# Patient Record
Sex: FEMALE | Race: WHITE | NOT HISPANIC OR LATINO | Employment: OTHER | URBAN - METROPOLITAN AREA
[De-identification: names, ages, dates, MRNs, and addresses within clinical notes are randomized per-mention and may not be internally consistent; named-entity substitution may affect disease eponyms.]

---

## 2023-09-14 PROBLEM — E78.2 MIXED HYPERLIPIDEMIA: Status: ACTIVE | Noted: 2023-09-14

## 2023-09-14 PROBLEM — E11.65 TYPE 2 DIABETES MELLITUS WITH HYPERGLYCEMIA, WITHOUT LONG-TERM CURRENT USE OF INSULIN (MULTI): Status: ACTIVE | Noted: 2023-09-14

## 2023-09-14 PROBLEM — H26.9 BILATERAL CATARACTS: Status: ACTIVE | Noted: 2023-09-14

## 2023-09-14 RX ORDER — PEN NEEDLE, DIABETIC 30 GX3/16"
NEEDLE, DISPOSABLE MISCELLANEOUS DAILY
COMMUNITY
End: 2024-02-20 | Stop reason: WASHOUT

## 2023-09-14 RX ORDER — METFORMIN HYDROCHLORIDE 500 MG/1
1000 TABLET, EXTENDED RELEASE ORAL 2 TIMES DAILY
COMMUNITY
End: 2023-10-26 | Stop reason: SDUPTHER

## 2023-09-14 RX ORDER — PRAVASTATIN SODIUM 40 MG/1
40 TABLET ORAL DAILY
COMMUNITY

## 2023-09-14 RX ORDER — SEMAGLUTIDE 1.34 MG/ML
INJECTION, SOLUTION SUBCUTANEOUS
COMMUNITY
Start: 2023-03-13 | End: 2023-10-26 | Stop reason: ALTCHOICE

## 2023-09-14 RX ORDER — INSULIN GLARGINE 100 [IU]/ML
INJECTION, SOLUTION SUBCUTANEOUS DAILY
COMMUNITY
End: 2023-11-08 | Stop reason: WASHOUT

## 2023-09-14 RX ORDER — SEMAGLUTIDE 1.34 MG/ML
0.25 INJECTION, SOLUTION SUBCUTANEOUS
COMMUNITY
Start: 2023-02-24 | End: 2023-12-28 | Stop reason: WASHOUT

## 2023-10-24 ENCOUNTER — LAB (OUTPATIENT)
Dept: LAB | Facility: LAB | Age: 63
End: 2023-10-24
Payer: MEDICARE

## 2023-10-24 DIAGNOSIS — E11.65 TYPE 2 DIABETES MELLITUS WITH HYPERGLYCEMIA (MULTI): Primary | ICD-10-CM

## 2023-10-24 DIAGNOSIS — E78.2 MIXED HYPERLIPIDEMIA: ICD-10-CM

## 2023-10-24 LAB
ALBUMIN SERPL BCP-MCNC: 4.2 G/DL (ref 3.4–5)
ALP SERPL-CCNC: 55 U/L (ref 33–136)
ALT SERPL W P-5'-P-CCNC: 9 U/L (ref 7–45)
ANION GAP SERPL CALC-SCNC: 12 MMOL/L (ref 10–20)
AST SERPL W P-5'-P-CCNC: 10 U/L (ref 9–39)
BASOPHILS # BLD AUTO: 0.03 X10*3/UL (ref 0–0.1)
BASOPHILS NFR BLD AUTO: 0.7 %
BILIRUB SERPL-MCNC: 0.6 MG/DL (ref 0–1.2)
BUN SERPL-MCNC: 13 MG/DL (ref 6–23)
CALCIUM SERPL-MCNC: 9.4 MG/DL (ref 8.6–10.3)
CHLORIDE SERPL-SCNC: 100 MMOL/L (ref 98–107)
CHOLEST SERPL-MCNC: 154 MG/DL (ref 0–199)
CHOLESTEROL/HDL RATIO: 2.1
CO2 SERPL-SCNC: 28 MMOL/L (ref 21–32)
CREAT SERPL-MCNC: 0.56 MG/DL (ref 0.5–1.05)
CREAT UR-MCNC: 50.9 MG/DL (ref 20–320)
EOSINOPHIL # BLD AUTO: 0.09 X10*3/UL (ref 0–0.7)
EOSINOPHIL NFR BLD AUTO: 2 %
ERYTHROCYTE [DISTWIDTH] IN BLOOD BY AUTOMATED COUNT: 11.9 % (ref 11.5–14.5)
GFR SERPL CREATININE-BSD FRML MDRD: >90 ML/MIN/1.73M*2
GLUCOSE SERPL-MCNC: 100 MG/DL (ref 74–99)
HCT VFR BLD AUTO: 36.3 % (ref 36–46)
HDLC SERPL-MCNC: 74.2 MG/DL
HGB BLD-MCNC: 11.5 G/DL (ref 12–16)
IMM GRANULOCYTES # BLD AUTO: 0.01 X10*3/UL (ref 0–0.7)
IMM GRANULOCYTES NFR BLD AUTO: 0.2 % (ref 0–0.9)
LDLC SERPL CALC-MCNC: 68 MG/DL
LYMPHOCYTES # BLD AUTO: 1.67 X10*3/UL (ref 1.2–4.8)
LYMPHOCYTES NFR BLD AUTO: 37.8 %
MCH RBC QN AUTO: 29.4 PG (ref 26–34)
MCHC RBC AUTO-ENTMCNC: 31.7 G/DL (ref 32–36)
MCV RBC AUTO: 93 FL (ref 80–100)
MICROALBUMIN UR-MCNC: <7 MG/L
MICROALBUMIN/CREAT UR: NORMAL MG/G{CREAT}
MONOCYTES # BLD AUTO: 0.37 X10*3/UL (ref 0.1–1)
MONOCYTES NFR BLD AUTO: 8.4 %
NEUTROPHILS # BLD AUTO: 2.25 X10*3/UL (ref 1.2–7.7)
NEUTROPHILS NFR BLD AUTO: 50.9 %
NON HDL CHOLESTEROL: 80 MG/DL (ref 0–149)
NRBC BLD-RTO: 0 /100 WBCS (ref 0–0)
PLATELET # BLD AUTO: 285 X10*3/UL (ref 150–450)
PMV BLD AUTO: 11.1 FL (ref 7.5–11.5)
POTASSIUM SERPL-SCNC: 4.3 MMOL/L (ref 3.5–5.3)
PROT SERPL-MCNC: 6.3 G/DL (ref 6.4–8.2)
RBC # BLD AUTO: 3.91 X10*6/UL (ref 4–5.2)
SODIUM SERPL-SCNC: 136 MMOL/L (ref 136–145)
TRIGL SERPL-MCNC: 58 MG/DL (ref 0–149)
TSH SERPL-ACNC: 2.45 MIU/L (ref 0.44–3.98)
VIT B12 SERPL-MCNC: 235 PG/ML (ref 211–911)
VLDL: 12 MG/DL (ref 0–40)
WBC # BLD AUTO: 4.4 X10*3/UL (ref 4.4–11.3)

## 2023-10-24 PROCEDURE — 82043 UR ALBUMIN QUANTITATIVE: CPT

## 2023-10-24 PROCEDURE — 82570 ASSAY OF URINE CREATININE: CPT

## 2023-10-24 PROCEDURE — 82607 VITAMIN B-12: CPT

## 2023-10-24 PROCEDURE — 36415 COLL VENOUS BLD VENIPUNCTURE: CPT

## 2023-10-25 NOTE — PROGRESS NOTES
"HPI:    62 yo with  Diabetes 2 (workup for late onset type 1 neg), Dyslipidemia presents for followup. Last A1c-7.6%, today . Pt is testing sugars <1 times per day. Pt is having low sugars 0 times/week. Pt's typical blood sugars are running 100 typically , highest 160, not testing at other times of day.  Pt is following a carb controlled diet and knows reasonable carb allowances. Pt is able to afford their medications. Pt is exercising walking 3 miles/day.           Pt taking metformin, glimepiride 4mg qd-pt recently stopped, ozempic 0.5 mg weekly (decreased from 1mg as pt's wt went to 106lbs) ,jardiance nov 2019 came off fall 2020 due to repeat mycotic infections.           Taking pravastatin 40mg for primary prevention.      Current Outpatient Medications:     insulin glargine (Lantus Solostar U-100 Insulin) 100 unit/mL (3 mL) pen, Inject under the skin once daily. UP TO 30 UNITS UNDER THE SKIN, Disp: , Rfl:     insulin glargine (Lantus) 100 unit/mL (3 mL) pen, INJECT UP TO 30 UNITS UNDER THE SKIN ONCE A DAY AS DIRECTED, Disp: 30 mL, Rfl: 1    ketoconazole (NIZOral) 2 % shampoo, APPLY SHAMPOO TO SCALP 2-3 TIMES WEEKLY, LEAVE ON 5-10 MINS, THEN RINSE, Disp: 120 mL, Rfl: 0    ONETOUCH DELICA LANCETS MISC, once daily. Test as directed, Disp: , Rfl:     pen needle, diabetic 32 gauge x 5/32\" needle, once daily., Disp: , Rfl:     pen needle, diabetic 32 gauge x 5/32\" needle, USE AS DIRECTED DAILY, Disp: 100 each, Rfl: 3    pravastatin (Pravachol) 40 mg tablet, Take 1 tablet (40 mg) by mouth once daily., Disp: , Rfl:     metFORMIN  mg 24 hr tablet, Take 1 tablet (500 mg) by mouth 2 times a day with meals., Disp: 360 tablet, Rfl: 3    semaglutide (Ozempic) 0.25 mg or 0.5 mg(2 mg/1.5 mL) pen injector, Inject 0.25 mg under the skin 1 (one) time per week., Disp: , Rfl:      Review of Systems:  Cardiology: Lightheadedness-denies.  Chest pain-denies.  Leg edema-denies.  Palpitations-denies.  Respiratory: Cough-denies. " Shortness of breath-denies.  Wheezing-denies.  Gastroenterology: Constipation-denies.  Diarrhea-denies.  Heartburn-denies.  Endocrinology: Cold intolerance-denies.  Heat intolerance-denies.  Sweats-denies.  Neurology: Headache-denies.  Tremor-denies.  Neuropathy in extremities-denies.  Psychology: Low energy-denies.  Irritability-denies.  Sleep disturbances-denies.    Labs: oct 2023- cr-0.56, lft-wnl, ldl-68, urine neg, tsh-2.45, hematocrit-36.3%    /70   Wt 54.7 kg (120 lb 9.6 oz)   BMI 21.36 kg/m²     Physical Exam:  General Appearance: pleasant, cooperative, no acute distress  HEENT: no chemosis, no proptosis, no lid lag, no lid retraction  Neck: no lymphadenopathy, no thyromegaly, no dominant thyroid nodules  Heart: no murmurs, regular rate and rhythm, S1 and S2  Lungs: no wheezes, no rhonci, no rales  Extremities: no lower extremity swelling    Assessment and Plan:  1. Type 2 diabetes mellitus with hyperglycemia, without long-term current use of insulin (CMS/[facility])  -good response to basal insulin  -wt has come up to a safe range after lowering ozempic to 0.5mg  -gave info to get a torito 2 or 3, once you get it call office for training with Anthony Lyon  -at that visit work on pt assistance/help through pharmacy for ozempic  -reviewed labs with pt    2. Mixed hyperlipidemia  -on statin and tolerating         Follow Up:  -Valdemar 6 months    -labs/tests/notes reviewed  -reviewed and counseled patient on medication monitoring and side effects

## 2023-10-26 ENCOUNTER — OFFICE VISIT (OUTPATIENT)
Dept: ENDOCRINOLOGY | Facility: CLINIC | Age: 63
End: 2023-10-26
Payer: MEDICARE

## 2023-10-26 ENCOUNTER — PHARMACY VISIT (OUTPATIENT)
Dept: PHARMACY | Facility: CLINIC | Age: 63
End: 2023-10-26
Payer: MEDICARE

## 2023-10-26 VITALS — BODY MASS INDEX: 21.36 KG/M2 | WEIGHT: 120.6 LBS | DIASTOLIC BLOOD PRESSURE: 70 MMHG | SYSTOLIC BLOOD PRESSURE: 124 MMHG

## 2023-10-26 DIAGNOSIS — E78.2 MIXED HYPERLIPIDEMIA: ICD-10-CM

## 2023-10-26 DIAGNOSIS — E11.65 TYPE 2 DIABETES MELLITUS WITH HYPERGLYCEMIA, WITHOUT LONG-TERM CURRENT USE OF INSULIN (MULTI): Primary | ICD-10-CM

## 2023-10-26 LAB — POC HEMOGLOBIN A1C: 6.6 % (ref 4.2–6.5)

## 2023-10-26 PROCEDURE — 3048F LDL-C <100 MG/DL: CPT | Performed by: INTERNAL MEDICINE

## 2023-10-26 PROCEDURE — RXMED WILLOW AMBULATORY MEDICATION CHARGE

## 2023-10-26 PROCEDURE — 3078F DIAST BP <80 MM HG: CPT | Performed by: INTERNAL MEDICINE

## 2023-10-26 PROCEDURE — 3062F POS MACROALBUMINURIA REV: CPT | Performed by: INTERNAL MEDICINE

## 2023-10-26 PROCEDURE — 1159F MED LIST DOCD IN RCRD: CPT | Performed by: INTERNAL MEDICINE

## 2023-10-26 PROCEDURE — 83036 HEMOGLOBIN GLYCOSYLATED A1C: CPT | Performed by: INTERNAL MEDICINE

## 2023-10-26 PROCEDURE — 1126F AMNT PAIN NOTED NONE PRSNT: CPT | Performed by: INTERNAL MEDICINE

## 2023-10-26 PROCEDURE — 3074F SYST BP LT 130 MM HG: CPT | Performed by: INTERNAL MEDICINE

## 2023-10-26 PROCEDURE — 99214 OFFICE O/P EST MOD 30 MIN: CPT | Performed by: INTERNAL MEDICINE

## 2023-10-26 PROCEDURE — 1036F TOBACCO NON-USER: CPT | Performed by: INTERNAL MEDICINE

## 2023-10-26 RX ORDER — METFORMIN HYDROCHLORIDE 500 MG/1
500 TABLET, EXTENDED RELEASE ORAL
Qty: 360 TABLET | Refills: 3 | Status: SHIPPED | OUTPATIENT
Start: 2023-10-26 | End: 2024-01-08 | Stop reason: WASHOUT

## 2023-10-26 ASSESSMENT — PATIENT HEALTH QUESTIONNAIRE - PHQ9
1. LITTLE INTEREST OR PLEASURE IN DOING THINGS: NOT AT ALL
2. FEELING DOWN, DEPRESSED OR HOPELESS: NOT AT ALL
SUM OF ALL RESPONSES TO PHQ9 QUESTIONS 1 & 2: 0

## 2023-10-26 ASSESSMENT — PAIN SCALES - GENERAL: PAINLEVEL: 0-NO PAIN

## 2023-10-26 ASSESSMENT — ENCOUNTER SYMPTOMS
OCCASIONAL FEELINGS OF UNSTEADINESS: 0
DEPRESSION: 0
LOSS OF SENSATION IN FEET: 0

## 2023-10-28 ENCOUNTER — PHARMACY VISIT (OUTPATIENT)
Dept: PHARMACY | Facility: CLINIC | Age: 63
End: 2023-10-28
Payer: MEDICARE

## 2023-10-28 PROCEDURE — RXMED WILLOW AMBULATORY MEDICATION CHARGE

## 2023-10-30 ENCOUNTER — PHARMACY VISIT (OUTPATIENT)
Dept: PHARMACY | Facility: CLINIC | Age: 63
End: 2023-10-30
Payer: COMMERCIAL

## 2023-10-30 PROCEDURE — RXMED WILLOW AMBULATORY MEDICATION CHARGE

## 2023-10-30 RX ORDER — METFORMIN HYDROCHLORIDE 500 MG/1
1000 TABLET, EXTENDED RELEASE ORAL 2 TIMES DAILY
Qty: 360 TABLET | Refills: 3 | OUTPATIENT
Start: 2023-10-30

## 2023-11-08 ENCOUNTER — PHARMACY VISIT (OUTPATIENT)
Dept: PHARMACY | Facility: CLINIC | Age: 63
End: 2023-11-08
Payer: MEDICARE

## 2023-11-08 DIAGNOSIS — E11.65 TYPE 2 DIABETES MELLITUS WITH HYPERGLYCEMIA, WITHOUT LONG-TERM CURRENT USE OF INSULIN (MULTI): Primary | ICD-10-CM

## 2023-11-08 DIAGNOSIS — E11.65 TYPE 2 DIABETES MELLITUS WITH HYPERGLYCEMIA, WITHOUT LONG-TERM CURRENT USE OF INSULIN (MULTI): ICD-10-CM

## 2023-11-08 RX ORDER — INSULIN GLARGINE 100 [IU]/ML
INJECTION, SOLUTION SUBCUTANEOUS
Qty: 30 ML | Refills: 3 | Status: SHIPPED | OUTPATIENT
Start: 2023-11-08

## 2023-11-08 RX ORDER — INSULIN GLARGINE 100 [IU]/ML
30 INJECTION, SOLUTION SUBCUTANEOUS NIGHTLY
COMMUNITY
End: 2023-11-08 | Stop reason: SDUPTHER

## 2023-11-08 RX ORDER — INSULIN GLARGINE 100 [IU]/ML
INJECTION, SOLUTION SUBCUTANEOUS
Qty: 30 ML | Refills: 1 | Status: SHIPPED | OUTPATIENT
Start: 2023-11-08 | End: 2023-11-08 | Stop reason: WASHOUT

## 2023-11-15 ENCOUNTER — PHARMACY VISIT (OUTPATIENT)
Dept: PHARMACY | Facility: CLINIC | Age: 63
End: 2023-11-15
Payer: MEDICARE

## 2023-11-27 NOTE — PROGRESS NOTES
Subjective   Patient ID: Amaya Quiroz is a 63 y.o. female who presents for CGM training and education.  HPI:  62 yo with  Diabetes 2 (workup for late onset type 1 neg), and Dyslipidemia. Accompanied by her  for CGM training - brings supplies from [employer].   Last A1c 6.6%. Pt is testing sugars <1 times per day. Pt is having low sugars 0 times/week. Pt's typical blood sugars are running 100 typically, highest 160, not testing at other times of day.    New start on Tony 3 CGM today - franck downloaded on phone, account created and connected to practice in [place], and sensor successfully applied and started.   Pt is following a carb controlled diet and knows reasonable carb allowances.   Pt has been able to afford her medications until recently - was getting assistance through [facility] pharmacy who can no longer help with cost. Reports able to afford insulin, but she is in the coverage gap and the Ozempic is over $500. Will look into  PAP. Pt is exercising walking 3 miles/day.           Pt taking Lantus 10 units daily (received letter from insurance that Lantus will no longer be covered next year - must switch to Basaglar, Tresiba, or Toujeo), metformin ER 500mg - 2BID, Ozempic 0.5 mg weekly (decreased from 1mg as pt's wt went to 106lbs; has been tolerating well at 0.5mg).  - Unable to tolerate Jardiance due to repeat mycotic infections (stopped in 2020).  - Stopped glimepiride 4mg prior to last visit.            Taking pravastatin 40mg for primary prevention.      Current Outpatient Medications:     ketoconazole (NIZOral) 2 % shampoo, APPLY SHAMPOO TO SCALP 2-3 TIMES WEEKLY, LEAVE ON 5-10 MINS, THEN RINSE, Disp: 120 mL, Rfl: 0    Lantus Solostar U-100 Insulin 100 unit/mL (3 mL) pen, Take as directed per insulin instructions., Disp: 30 mL, Rfl: 3    metFORMIN  mg 24 hr tablet, Take 1 tablet (500 mg) by mouth 2 times a day with meals., Disp: 360 tablet, Rfl: 3    metFORMIN  mg 24 hr  "tablet, Take 2 tablets by mouth twice daily, Disp: 360 tablet, Rfl: 3    ONETOUCH DELICA LANCETS MISC, once daily. Test as directed, Disp: , Rfl:     pen needle, diabetic 32 gauge x 5/32\" needle, once daily., Disp: , Rfl:     pen needle, diabetic 32 gauge x 5/32\" needle, USE AS DIRECTED DAILY, Disp: 100 each, Rfl: 3    pravastatin (Pravachol) 40 mg tablet, Take 1 tablet (40 mg) by mouth once daily., Disp: , Rfl:     pravastatin (Pravachol) 40 mg tablet, Take 1 tablet (40 mg) by mouth once daily., Disp: 90 tablet, Rfl: 0    semaglutide (Ozempic) 0.25 mg or 0.5 mg(2 mg/1.5 mL) pen injector, Inject 0.25 mg under the skin 1 (one) time per week., Disp: , Rfl:      Review of Systems:  Cardiology: Lightheadedness-denies.  Chest pain-denies.  Leg edema-denies.  Palpitations-denies.  Respiratory: Cough-denies. Shortness of breath-denies.  Wheezing-denies.  Gastroenterology: Constipation-denies.  Diarrhea-denies.  Heartburn-denies.  Endocrinology: Cold intolerance-denies.  Heat intolerance-denies.  Sweats-denies.  Neurology: Headache-denies.  Tremor-denies.  Neuropathy in extremities-denies.  Psychology: Low energy-denies.  Irritability-denies.  Sleep disturbances-denies.    Labs: oct 2023- cr-0.56, lft-wnl, ldl-68, urine neg, tsh-2.45, hematocrit-36.3%    Assessment and Plan:  Problem List Items Addressed This Visit       Type 2 diabetes mellitus with hyperglycemia, without long-term current use of insulin (CMS/[facility]) - Primary     Patient received the following instructions for Tony 3 personal start:  Sensor application and start up of sensor; aware to change in 14 days. Products for better adhesion; skin tac and simpatch. Expected differences in sensor glucose and blood glucose; always check with meter if symptoms do not match sensor glucose or if magnify glass appears on display. Trend arrows. Kalen functions: target ranges set to ; alerts set if applicable. Connecting to practice via [employer] in [place]. Remove for MRI, " CAT scan and X-ray. Call [facility] for problems with sensor, they will replace. Patient correctly applied sensor to back of upper arm and sensor session started.    Plan:   Check glucose on the Tony franck often - upon waking, before/after meals, bedtime, etc.   Follow readings and daily graphs closely, and learn which meals/snacks cause higher blood sugars.           Follow Up:  - Crystal LyonD, for cost assistance over next 1-2 months  - Valdemar 6 months as scheduled    CGM training and education provided by GLENDY Mendez, CrystalD, BCACP, CDCES.

## 2023-11-27 NOTE — ASSESSMENT & PLAN NOTE
Patient received the following instructions for Tony 3 personal start:  Sensor application and start up of sensor; aware to change in 14 days. Products for better adhesion; skin tac and simpatch. Expected differences in sensor glucose and blood glucose; always check with meter if symptoms do not match sensor glucose or if magnify glass appears on display. Trend arrows. Kalen functions: target ranges set to ; alerts set if applicable. Connecting to practice via [employer] in kalen. Remove for MRI, CAT scan and X-ray. Call [facility] for problems with sensor, they will replace. Patient correctly applied sensor to back of upper arm and sensor session started.    Plan:   Check glucose on the Tony kalen often - upon waking, before/after meals, bedtime, etc.   Follow readings and daily graphs closely, and learn which meals/snacks cause higher blood sugars.

## 2023-11-27 NOTE — PATIENT INSTRUCTIONS
Sonali will follow up with you about patient assistance for your Ozempic and insulin through  Pharmacies    Check glucose on the Tony franck often - upon waking, before meals, 1-2 hours after meals, bedtime, etc.    Goal sugars at different times of the day:  - Fasting / upon waking: less than 130  - Before meals: around 150 or less  - One or two hours after meals: less than 180  - Before bedtime: around 150 or less     Follow readings and daily graphs closely, and learn which meals/snacks cause higher blood sugars.    Call with any questions or concerns.     Follow up with Dr. Aldrich as scheduled.

## 2023-11-28 ENCOUNTER — CLINICAL SUPPORT (OUTPATIENT)
Dept: ENDOCRINOLOGY | Facility: CLINIC | Age: 63
End: 2023-11-28
Payer: MEDICARE

## 2023-11-28 ENCOUNTER — PHARMACY VISIT (OUTPATIENT)
Dept: PHARMACY | Facility: CLINIC | Age: 63
End: 2023-11-28
Payer: MEDICARE

## 2023-11-28 DIAGNOSIS — E11.65 TYPE 2 DIABETES MELLITUS WITH HYPERGLYCEMIA, WITHOUT LONG-TERM CURRENT USE OF INSULIN (MULTI): Primary | ICD-10-CM

## 2023-11-28 PROCEDURE — RXMED WILLOW AMBULATORY MEDICATION CHARGE

## 2023-11-28 RX ORDER — PRAVASTATIN SODIUM 40 MG/1
40 TABLET ORAL DAILY
Qty: 90 TABLET | Refills: 0 | OUTPATIENT
Start: 2023-05-22 | End: 2024-01-08 | Stop reason: WASHOUT

## 2023-12-26 ENCOUNTER — TELEPHONE (OUTPATIENT)
Dept: ENDOCRINOLOGY | Facility: CLINIC | Age: 63
End: 2023-12-26
Payer: MEDICARE

## 2023-12-26 NOTE — TELEPHONE ENCOUNTER
No samples are available in the office. Patient is advise to call office Friday when provider is in.

## 2023-12-26 NOTE — TELEPHONE ENCOUNTER
Amaya Quiroz   1960   [id number]   [phone]       Patient called and wanted to know if she can get a couple of sample pens of Ozempic due to she can not afford cost. However, patient mentioned she is working with Sonali for Patient Assistance. Message sent to provider.

## 2023-12-28 ENCOUNTER — TELEPHONE (OUTPATIENT)
Dept: ENDOCRINOLOGY | Facility: CLINIC | Age: 63
End: 2023-12-28
Payer: MEDICARE

## 2023-12-28 DIAGNOSIS — E11.65 TYPE 2 DIABETES MELLITUS WITH HYPERGLYCEMIA, WITHOUT LONG-TERM CURRENT USE OF INSULIN (MULTI): Primary | ICD-10-CM

## 2023-12-28 PROCEDURE — RXMED WILLOW AMBULATORY MEDICATION CHARGE

## 2023-12-28 RX ORDER — SEMAGLUTIDE 0.68 MG/ML
0.5 INJECTION, SOLUTION SUBCUTANEOUS
Qty: 3 ML | Refills: 0 | Status: SHIPPED | OUTPATIENT
Start: 2023-12-28 | End: 2024-01-19 | Stop reason: SDUPTHER

## 2023-12-28 NOTE — TELEPHONE ENCOUNTER
Pt unable to afford Ozempic at this time - see note from last visit.   Will send one month of Ozempic to [facility] pharmacy and use free trial card to supply patient for another month, then enroll in  PAP.

## 2023-12-29 ENCOUNTER — PHARMACY VISIT (OUTPATIENT)
Dept: PHARMACY | Facility: CLINIC | Age: 63
End: 2023-12-29
Payer: COMMERCIAL

## 2024-01-03 ENCOUNTER — TELEPHONE (OUTPATIENT)
Dept: PRIMARY CARE | Facility: CLINIC | Age: 64
End: 2024-01-03
Payer: MEDICARE

## 2024-01-03 NOTE — TELEPHONE ENCOUNTER
Patient is calling she has been sick for 2 weeks, c/o sinus pressure, coughing, no production, runny nose, chest feels tight, blowing a clear to green mucous, she has been using Tylenol, she is looking for advise

## 2024-01-08 ENCOUNTER — PHARMACY VISIT (OUTPATIENT)
Dept: PHARMACY | Facility: CLINIC | Age: 64
End: 2024-01-08
Payer: COMMERCIAL

## 2024-01-08 ENCOUNTER — OFFICE VISIT (OUTPATIENT)
Dept: PRIMARY CARE | Facility: CLINIC | Age: 64
End: 2024-01-08
Payer: MEDICARE

## 2024-01-08 VITALS
BODY MASS INDEX: 21.65 KG/M2 | DIASTOLIC BLOOD PRESSURE: 76 MMHG | HEART RATE: 84 BPM | WEIGHT: 122.2 LBS | SYSTOLIC BLOOD PRESSURE: 138 MMHG

## 2024-01-08 DIAGNOSIS — Z78.0 ENCOUNTER FOR OSTEOPOROSIS SCREENING IN ASYMPTOMATIC POSTMENOPAUSAL PATIENT: ICD-10-CM

## 2024-01-08 DIAGNOSIS — R06.2 WHEEZING: ICD-10-CM

## 2024-01-08 DIAGNOSIS — Z12.31 ENCOUNTER FOR SCREENING MAMMOGRAM FOR MALIGNANT NEOPLASM OF BREAST: ICD-10-CM

## 2024-01-08 DIAGNOSIS — Z13.820 ENCOUNTER FOR OSTEOPOROSIS SCREENING IN ASYMPTOMATIC POSTMENOPAUSAL PATIENT: ICD-10-CM

## 2024-01-08 DIAGNOSIS — J01.10 ACUTE NON-RECURRENT FRONTAL SINUSITIS: Primary | ICD-10-CM

## 2024-01-08 PROBLEM — G56.00 CARPAL TUNNEL SYNDROME: Status: ACTIVE | Noted: 2017-01-05

## 2024-01-08 PROCEDURE — RXMED WILLOW AMBULATORY MEDICATION CHARGE

## 2024-01-08 PROCEDURE — 1036F TOBACCO NON-USER: CPT | Performed by: FAMILY MEDICINE

## 2024-01-08 PROCEDURE — 1126F AMNT PAIN NOTED NONE PRSNT: CPT | Performed by: FAMILY MEDICINE

## 2024-01-08 PROCEDURE — 1160F RVW MEDS BY RX/DR IN RCRD: CPT | Performed by: FAMILY MEDICINE

## 2024-01-08 PROCEDURE — 99213 OFFICE O/P EST LOW 20 MIN: CPT | Performed by: FAMILY MEDICINE

## 2024-01-08 PROCEDURE — 1159F MED LIST DOCD IN RCRD: CPT | Performed by: FAMILY MEDICINE

## 2024-01-08 PROCEDURE — 1170F FXNL STATUS ASSESSED: CPT | Performed by: FAMILY MEDICINE

## 2024-01-08 PROCEDURE — 3075F SYST BP GE 130 - 139MM HG: CPT | Performed by: FAMILY MEDICINE

## 2024-01-08 PROCEDURE — 3078F DIAST BP <80 MM HG: CPT | Performed by: FAMILY MEDICINE

## 2024-01-08 RX ORDER — TRIAMCINOLONE ACETONIDE 0.25 MG/G
CREAM TOPICAL
COMMUNITY
Start: 2023-06-15

## 2024-01-08 RX ORDER — BENZONATATE 200 MG/1
200 CAPSULE ORAL 3 TIMES DAILY PRN
Qty: 60 CAPSULE | Refills: 0 | Status: SHIPPED | OUTPATIENT
Start: 2024-01-08 | End: 2024-01-28

## 2024-01-08 RX ORDER — DOXYCYCLINE 100 MG/1
100 CAPSULE ORAL 2 TIMES DAILY
Qty: 14 CAPSULE | Refills: 0 | Status: SHIPPED | OUTPATIENT
Start: 2024-01-08 | End: 2024-01-15

## 2024-01-08 RX ORDER — METHYLPREDNISOLONE 4 MG/1
TABLET ORAL
Qty: 21 TABLET | Refills: 0 | Status: SHIPPED | OUTPATIENT
Start: 2024-01-08 | End: 2024-02-20 | Stop reason: WASHOUT

## 2024-01-08 ASSESSMENT — PATIENT HEALTH QUESTIONNAIRE - PHQ9
2. FEELING DOWN, DEPRESSED OR HOPELESS: NOT AT ALL
1. LITTLE INTEREST OR PLEASURE IN DOING THINGS: NOT AT ALL
SUM OF ALL RESPONSES TO PHQ9 QUESTIONS 1 AND 2: 0

## 2024-01-08 ASSESSMENT — ACTIVITIES OF DAILY LIVING (ADL)
WALKS IN HOME: INDEPENDENT
DRESSING YOURSELF: INDEPENDENT
HEARING - LEFT EAR: FUNCTIONAL
TOILETING: INDEPENDENT
BATHING: INDEPENDENT
FEEDING YOURSELF: INDEPENDENT
PATIENT'S MEMORY ADEQUATE TO SAFELY COMPLETE DAILY ACTIVITIES?: YES
GROOMING: INDEPENDENT
HEARING - RIGHT EAR: FUNCTIONAL
ADEQUATE_TO_COMPLETE_ADL: YES
JUDGMENT_ADEQUATE_SAFELY_COMPLETE_DAILY_ACTIVITIES: YES

## 2024-01-08 ASSESSMENT — PAIN SCALES - GENERAL: PAINLEVEL: 0-NO PAIN

## 2024-01-08 NOTE — PROGRESS NOTES
" Sinusitis: sick for 10 days         presents with c/o Facial pain Yes frontal area         Nasal congestion Yes          Rhinorrhea Yes clear to purulent         Fever No          Sore throat No          Post-nasal drip No           Cough productive of clear sputum   Ear/General:          c/o Pain  itching/blocked     ENT/Respiratory:          c/o Shortness of breath No          wheezing Yes     MEDICAL HISTORY:    Patient Active Problem List   Diagnosis    Bilateral cataracts    Mixed hyperlipidemia    Type 2 diabetes mellitus with hyperglycemia, without long-term current use of insulin (CMS/[facility])    Carpal tunnel syndrome      MEDICATIONS:    Current Outpatient Medications   Medication Sig Dispense Refill    Lantus Solostar U-100 Insulin 100 unit/mL (3 mL) pen Take as directed per insulin instructions. 30 mL 3    metFORMIN  mg 24 hr tablet Take 2 tablets by mouth twice daily 360 tablet 3    ONETOUCH DELICA LANCETS MISC once daily. Test as directed      Ozempic 0.25 mg or 0.5 mg (2 mg/3 mL) pen injector Inject 0.5 mg under the skin 1 (one) time per week. 3 mL 0    pen needle, diabetic 32 gauge x 5/32\" needle once daily.      pen needle, diabetic 32 gauge x 5/32\" needle USE AS DIRECTED DAILY 100 each 3    pravastatin (Pravachol) 40 mg tablet Take 1 tablet (40 mg) by mouth once daily.      triamcinolone (Kenalog) 0.025 % cream APPLY TO AFFECTED AREA(S) 2 TIMES A DAY FOR 14 DAYS, THEN STOP FOR AT LEAST 7 DAYS. MAY REPEAT TREATMENT AS NEEDED      benzonatate (Tessalon) 200 mg capsule Take 1 capsule (200 mg) by mouth 3 times a day as needed for cough for up to 20 days. Do not crush or chew. 60 capsule 0    doxycycline (Monodox) 100 mg capsule Take 1 capsule (100 mg) by mouth 2 times a day for 7 days. Take with at least 8 ounces (large glass) of water, do not lie down for 30 minutes after 14 capsule 0    ketoconazole (NIZOral) 2 % shampoo APPLY SHAMPOO TO SCALP 2-3 TIMES WEEKLY, LEAVE ON 5-10 MINS, THEN RINSE " (Patient not taking: Reported on 1/8/2024) 120 mL 0    methylPREDNISolone (Medrol Dospak) 4 mg tablets Take each day's dose once a day in the AM with food 21 each 0     No current facility-administered medications for this visit.     ALLERGIES: No Known Allergies  SOCIAL HISTORY:   Social History     Tobacco Use    Smoking status: Never    Smokeless tobacco: Never   Vaping Use    Vaping Use: Never used   Substance Use Topics    Alcohol use: Not Currently    Drug use: Defer       ROS:       CONSTITUTION:  see HPI for details        HEENT:          see HPI for details.         RESPIRATORY:          See HPI for details.         GASTROENTEROLOGY:          See HPI for details.        VITAL SIGNS:  /76   Pulse 84   Wt 55.4 kg (122 lb 3.2 oz)   BMI 21.65 kg/m²     PE:   HEENT:  nc/at, sinuses NT, ears with nl external canals, TM normal w/o redness, bulging, throat normal w/o redness/exdudate.tonsillar swelling  NECK:  no lymphadenopathy; FROM  LUNGS:  CTA, no wheezing/rhonchi/rales  HEART:  RRR, no murmurs      Amaya was seen today for cough.  Diagnoses and all orders for this visit:  Acute non-recurrent frontal sinusitis (Primary)  -     doxycycline (Monodox) 100 mg capsule; Take 1 capsule (100 mg) by mouth 2 times a day for 7 days. Take with at least 8 ounces (large glass) of water, do not lie down for 30 minutes after  -     benzonatate (Tessalon) 200 mg capsule; Take 1 capsule (200 mg) by mouth 3 times a day as needed for cough for up to 20 days. Do not crush or chew.  Wheezing  -     methylPREDNISolone (Medrol Dospak) 4 mg tablets; Take each day's dose once a day in the AM with food  Encounter for screening mammogram for malignant neoplasm of breast  -     BI mammo bilateral screening tomosynthesis; Future  Encounter for osteoporosis screening in asymptomatic postmenopausal patient  -     XR DEXA bone density; Future

## 2024-01-19 ENCOUNTER — PHARMACY VISIT (OUTPATIENT)
Dept: PHARMACY | Facility: CLINIC | Age: 64
End: 2024-01-19
Payer: COMMERCIAL

## 2024-01-19 DIAGNOSIS — E11.65 TYPE 2 DIABETES MELLITUS WITH HYPERGLYCEMIA, WITHOUT LONG-TERM CURRENT USE OF INSULIN (MULTI): ICD-10-CM

## 2024-01-19 PROCEDURE — RXMED WILLOW AMBULATORY MEDICATION CHARGE

## 2024-01-19 RX ORDER — SEMAGLUTIDE 0.68 MG/ML
0.5 INJECTION, SOLUTION SUBCUTANEOUS
Qty: 3 ML | Refills: 0 | Status: SHIPPED | OUTPATIENT
Start: 2024-01-19 | End: 2024-02-20 | Stop reason: SDUPTHER

## 2024-01-22 ENCOUNTER — TELEPHONE (OUTPATIENT)
Dept: ENDOCRINOLOGY | Facility: CLINIC | Age: 64
End: 2024-01-22
Payer: MEDICARE

## 2024-01-22 NOTE — TELEPHONE ENCOUNTER
Amaya Quiroz   1960   [id number]   [phone]       Sonali: Patient called in need of Patient Assistance for Ozempic. However, patient was already  provided samples and have one week left of Ozempic. Patient mentioned she wanted to wait until they get 2023 w-2 to submit documentation because she retired last year. Patient is requesting that you call her because I am not sure if she understand and I have provided all information that's on sheet.     Dr. Aldrich would you like for patient to go on another medication because she only one week left of Ozempic and can not afford he cost.

## 2024-02-12 ENCOUNTER — HOSPITAL ENCOUNTER (OUTPATIENT)
Dept: RADIOLOGY | Facility: CLINIC | Age: 64
Discharge: HOME | End: 2024-02-12
Payer: MEDICARE

## 2024-02-12 VITALS — BODY MASS INDEX: 21.26 KG/M2 | HEIGHT: 63 IN | WEIGHT: 120 LBS

## 2024-02-12 DIAGNOSIS — Z12.31 ENCOUNTER FOR SCREENING MAMMOGRAM FOR MALIGNANT NEOPLASM OF BREAST: ICD-10-CM

## 2024-02-12 DIAGNOSIS — Z78.0 ENCOUNTER FOR OSTEOPOROSIS SCREENING IN ASYMPTOMATIC POSTMENOPAUSAL PATIENT: ICD-10-CM

## 2024-02-12 DIAGNOSIS — R92.8 ABNORMAL MAMMOGRAM: Primary | ICD-10-CM

## 2024-02-12 DIAGNOSIS — Z13.820 ENCOUNTER FOR OSTEOPOROSIS SCREENING IN ASYMPTOMATIC POSTMENOPAUSAL PATIENT: ICD-10-CM

## 2024-02-12 PROCEDURE — 77085 DXA BONE DENSITY AXL VRT FX: CPT

## 2024-02-12 PROCEDURE — 77067 SCR MAMMO BI INCL CAD: CPT

## 2024-02-12 NOTE — RESULT ENCOUNTER NOTE
Pt needs additional views; US ordered; can call number on  result or someone will call her for f/u views

## 2024-02-13 ENCOUNTER — HOSPITAL ENCOUNTER (OUTPATIENT)
Dept: RADIOLOGY | Facility: HOSPITAL | Age: 64
Discharge: HOME | End: 2024-02-13
Payer: MEDICARE

## 2024-02-13 DIAGNOSIS — R92.8 ABNORMAL MAMMOGRAM: ICD-10-CM

## 2024-02-13 PROCEDURE — 76642 ULTRASOUND BREAST LIMITED: CPT | Mod: LT

## 2024-02-13 PROCEDURE — 76982 USE 1ST TARGET LESION: CPT | Mod: LT

## 2024-02-14 ENCOUNTER — TELEPHONE (OUTPATIENT)
Dept: PRIMARY CARE | Facility: CLINIC | Age: 64
End: 2024-02-14
Payer: MEDICARE

## 2024-02-14 DIAGNOSIS — Z01.818 PREPROCEDURAL EXAMINATION: Primary | ICD-10-CM

## 2024-02-16 ENCOUNTER — LAB (OUTPATIENT)
Dept: LAB | Facility: LAB | Age: 64
End: 2024-02-16
Payer: MEDICARE

## 2024-02-16 DIAGNOSIS — Z01.818 PREPROCEDURAL EXAMINATION: ICD-10-CM

## 2024-02-16 LAB — CALCIUM SERPL-MCNC: 9.6 MG/DL (ref 8.6–10.3)

## 2024-02-16 PROCEDURE — 36415 COLL VENOUS BLD VENIPUNCTURE: CPT

## 2024-02-20 ENCOUNTER — OFFICE VISIT (OUTPATIENT)
Dept: PRIMARY CARE | Facility: CLINIC | Age: 64
End: 2024-02-20
Payer: MEDICARE

## 2024-02-20 ENCOUNTER — TELEMEDICINE (OUTPATIENT)
Dept: PHARMACY | Facility: HOSPITAL | Age: 64
End: 2024-02-20
Payer: MEDICARE

## 2024-02-20 DIAGNOSIS — E11.65 TYPE 2 DIABETES MELLITUS WITH HYPERGLYCEMIA, UNSPECIFIED WHETHER LONG TERM INSULIN USE (MULTI): Primary | ICD-10-CM

## 2024-02-20 DIAGNOSIS — E11.65 TYPE 2 DIABETES MELLITUS WITH HYPERGLYCEMIA, WITHOUT LONG-TERM CURRENT USE OF INSULIN (MULTI): ICD-10-CM

## 2024-02-20 DIAGNOSIS — E11.65 TYPE 2 DIABETES MELLITUS WITH HYPERGLYCEMIA, UNSPECIFIED WHETHER LONG TERM INSULIN USE (MULTI): ICD-10-CM

## 2024-02-20 DIAGNOSIS — M81.0 AGE-RELATED OSTEOPOROSIS WITHOUT CURRENT PATHOLOGICAL FRACTURE: Primary | ICD-10-CM

## 2024-02-20 PROCEDURE — 1159F MED LIST DOCD IN RCRD: CPT | Performed by: FAMILY MEDICINE

## 2024-02-20 PROCEDURE — 1126F AMNT PAIN NOTED NONE PRSNT: CPT | Performed by: FAMILY MEDICINE

## 2024-02-20 PROCEDURE — 2500000004 HC RX 250 GENERAL PHARMACY W/ HCPCS (ALT 636 FOR OP/ED): Mod: JZ | Performed by: FAMILY MEDICINE

## 2024-02-20 PROCEDURE — 1036F TOBACCO NON-USER: CPT | Performed by: FAMILY MEDICINE

## 2024-02-20 PROCEDURE — 96372 THER/PROPH/DIAG INJ SC/IM: CPT | Performed by: FAMILY MEDICINE

## 2024-02-20 PROCEDURE — 1160F RVW MEDS BY RX/DR IN RCRD: CPT | Performed by: FAMILY MEDICINE

## 2024-02-20 RX ORDER — SEMAGLUTIDE 0.68 MG/ML
0.5 INJECTION, SOLUTION SUBCUTANEOUS
Qty: 9 ML | Refills: 3 | Status: SHIPPED | OUTPATIENT
Start: 2024-02-20

## 2024-02-20 RX ORDER — INSULIN DEGLUDEC 100 U/ML
INJECTION, SOLUTION SUBCUTANEOUS
Qty: 30 ML | Refills: 3 | Status: SHIPPED | OUTPATIENT
Start: 2024-02-20 | End: 2024-04-26 | Stop reason: WASHOUT

## 2024-02-20 RX ADMIN — DENOSUMAB 60 MG: 60 INJECTION SUBCUTANEOUS at 10:04

## 2024-02-20 NOTE — PROGRESS NOTES
"[facility]      Amaya Quiroz a 64 y.o. patient was referred to the Clinical Pharmacy Team for diabetes management.  Presents today via telephone for initial assessment and management.      Referring Provider: Wil Aldrich MD     Pt has been able to afford her medications until recently - was getting assistance through [facility] pharmacy who can no longer help with cost.            Pt taking Lantus 10 units daily (received letter from insurance that Lantus will no longer be covered next year - must switch to Basaglar, Tresiba, or Toujeo),  metformin ER 500mg 2BID,  Ozempic 0.5 mg weekly (decreased from 1mg as pt's wt went to 106lbs; has been tolerating well at 0.5mg).  - did not tolerate Jardiance (repeat mycotic infections,  stopped fall 2020).  - pt stopped glimepiride 4mg prior to Oct 2023 visit.            Taking pravastatin 40mg for primary prevention      Current Outpatient Medications:     Lantus Solostar U-100 Insulin 100 unit/mL (3 mL) pen, Take as directed per insulin instructions., Disp: 30 mL, Rfl: 3    metFORMIN  mg 24 hr tablet, Take 2 tablets by mouth twice daily, Disp: 360 tablet, Rfl: 3    ONETOUCH DELICA LANCETS MISC, once daily. Test as directed, Disp: , Rfl:     Ozempic 0.25 mg or 0.5 mg (2 mg/3 mL) pen injector, Inject 0.5 mg under the skin 1 (one) time per week., Disp: 3 mL, Rfl: 0    pen needle, diabetic 32 gauge x 5/32\" needle, USE AS DIRECTED DAILY, Disp: 100 each, Rfl: 3    pravastatin (Pravachol) 40 mg tablet, Take 1 tablet (40 mg) by mouth once daily., Disp: , Rfl:     triamcinolone (Kenalog) 0.025 % cream, APPLY TO AFFECTED AREA(S) 2 TIMES A DAY FOR 14 DAYS, THEN STOP FOR AT LEAST 7 DAYS. MAY REPEAT TREATMENT AS NEEDED, Disp: , Rfl:     Current Facility-Administered Medications:     denosumab (Prolia) injection 60 mg, 60 mg, subcutaneous, q6 months, Jen Cano MD, 60 mg at 02/20/24 1004     Allergies as of 02/20/2024    (No Known Allergies)       Lab " Results   Component Value Date    HGBA1C 6.6 (A) 10/26/2023       Lab Results   Component Value Date    BILITOT 0.6 10/24/2023    CALCIUM 9.6 02/16/2024    CO2 28 10/24/2023     10/24/2023    CREATININE 0.56 10/24/2023    GLUCOSE 100 (H) 10/24/2023    ALKPHOS 55 10/24/2023    K 4.3 10/24/2023    PROT 6.3 (L) 10/24/2023     10/24/2023    AST 10 10/24/2023    ALT 9 10/24/2023    BUN 13 10/24/2023    ANIONGAP 12 10/24/2023    ALBUMIN 4.2 10/24/2023    EGFR >90 10/24/2023       Lab Results   Component Value Date    CHOL 154 10/24/2023    TRIG 58 10/24/2023    HDL 74.2 10/24/2023    LDLCALC 68 10/24/2023       Lab Results   Component Value Date    MICROALBCREA  10/24/2023      Comment:      One or more analytes used in this calculation is outside of the analytical measurement range. Calculation cannot be performed.        Patient Assistance Screening (VAF)  Patient verbally reports monthly or yearly income which is less than 400% federal poverty level.  Application for program has been submitted for the following medications: Ozempic and Tresiba  Patient has been informed that program team will be reaching out to them to discuss necessary documentation, instructed to answer phone/return voicemail.   Patient aware this process may take up to 6 weeks.   If approved medication must be filled through [facility] pharmacy and may be picked up or mailed to patient.    PATIENT EDUCATION/DISCUSSION:  - Counseled patient on MOA, expectations, side effects, duration of therapy, contraindications, administration, and monitoring parameters  - Answered all patient questions and concerns    Assessment/Plan   1. Type 2 diabetes mellitus with hyperglycemia, unspecified whether long term insulin use (CMS/[facility])    1. Continue all meds under the continuation of care with the referring provider and clinical pharmacy team.  2. Prescription(s) sent to [facility] pharmacy for assistance on authorization and copay. Medication will  be mailed to patient.    Follow up as scheduled with Dr. Aldrich & team in endocrinology office.  Follow up annually with clinical pharmacist for re-enrollment in [facility]    Thank you,   Karime Ward, PharmD, BC-[facility], [facility]       Verbal consent to manage patient's drug therapy was obtained from the patient and/or an individual authorized to act on behalf of a patient. They were informed they may decline to participate or withdraw from participation in pharmacy services at any time.

## 2024-02-28 ENCOUNTER — SPECIALTY PHARMACY (OUTPATIENT)
Dept: PHARMACY | Facility: CLINIC | Age: 64
End: 2024-02-28

## 2024-03-01 PROCEDURE — RXMED WILLOW AMBULATORY MEDICATION CHARGE

## 2024-03-02 DIAGNOSIS — E78.2 MIXED HYPERLIPIDEMIA: Primary | ICD-10-CM

## 2024-03-03 RX ORDER — PRAVASTATIN SODIUM 40 MG/1
40 TABLET ORAL DAILY
Qty: 90 TABLET | Refills: 0 | Status: SHIPPED | OUTPATIENT
Start: 2024-03-03 | End: 2024-04-26 | Stop reason: SDUPTHER

## 2024-03-04 ENCOUNTER — PHARMACY VISIT (OUTPATIENT)
Dept: PHARMACY | Facility: CLINIC | Age: 64
End: 2024-03-04
Payer: COMMERCIAL

## 2024-03-04 PROCEDURE — RXMED WILLOW AMBULATORY MEDICATION CHARGE

## 2024-04-22 ENCOUNTER — PHARMACY VISIT (OUTPATIENT)
Dept: PHARMACY | Facility: CLINIC | Age: 64
End: 2024-04-22
Payer: COMMERCIAL

## 2024-04-22 PROCEDURE — RXMED WILLOW AMBULATORY MEDICATION CHARGE

## 2024-04-24 NOTE — PROGRESS NOTES
"HPI   62 yo with  Diabetes 2 (workup for late onset type 1 neg), Dyslipidemia presents for followup. A1c-6.5% today .     Pt is testing sugars 4 times per day with torito 3. Pt is having low sugars 0 times/week. Pt is following a carb controlled diet and knows reasonable carb allowances. Pt is able to afford their medications. Pt is exercising walking 3 miles/day.           Pt taking metformin,  ozempic 0.5 mg weekly (decreased from 1mg as pt's wt went to 106lbs), 10 units lantus every day (to change to tresiba)  -jardiance came off  due to repeat mycotic infections.  -call office if wt goes <120 and we will lower/stop ozempic    90 days torito 3 data: 79% in range, 2% low, pattern: low 100's overnight into waking, after breakfast mid 100's and through the day, around 100 at bedtime.           Taking pravastatin 40mg for primary prevention.    -pt had loc while at [employer] recently, happened in am, 1-2 hrs post breakfast, no low sugar, pt was waiting in line      Current Outpatient Medications:     Lantus Solostar U-100 Insulin 100 unit/mL (3 mL) pen, Take as directed per insulin instructions., Disp: 30 mL, Rfl: 3    metFORMIN  mg 24 hr tablet, Take 2 tablets by mouth twice daily, Disp: 360 tablet, Rfl: 3    ONETOUCH DELICA LANCETS MISC, once daily. Test as directed, Disp: , Rfl:     Ozempic 0.25 mg or 0.5 mg (2 mg/3 mL) pen injector, Inject 0.5 mg under the skin 1 (one) time per week., Disp: 9 mL, Rfl: 3    pen needle, diabetic 32 gauge x 5/32\" needle, USE AS DIRECTED DAILY, Disp: 100 each, Rfl: 3    pravastatin (Pravachol) 40 mg tablet, Take 1 tablet (40 mg) by mouth once daily., Disp: , Rfl:     pravastatin (Pravachol) 40 mg tablet, Take 1 tablet (40 mg) by mouth once daily., Disp: 90 tablet, Rfl: 0    Tresiba FlexTouch U-100 100 unit/mL (3 mL) injection, Take as directed up to 30 units total daily, Disp: 30 mL, Rfl: 3    triamcinolone (Kenalog) 0.025 % cream, APPLY TO AFFECTED AREA(S) 2 TIMES A DAY FOR " 14 DAYS, THEN STOP FOR AT LEAST 7 DAYS. MAY REPEAT TREATMENT AS NEEDED, Disp: , Rfl:     Current Facility-Administered Medications:     denosumab (Prolia) injection 60 mg, 60 mg, subcutaneous, q6 months, Jen Cano MD, 60 mg at 02/20/24 1004      Allergies as of 04/26/2024    (No Known Allergies)         Review of Systems   Cardiology: Lightheadedness-denies.  Chest pain-denies.  Leg edema-denies.  Palpitations-denies.  Respiratory: Cough-denies. Shortness of breath-denies.  Wheezing-denies.  Gastroenterology: Constipation-denies.  Diarrhea-denies.  Heartburn-denies.  Endocrinology: Cold intolerance-denies.  Heat intolerance-denies.  Sweats-denies.  Neurology: Headache-denies.  Tremor-denies.  Neuropathy in extremities-denies.  Psychology: Low energy-denies.  Irritability-denies.  Sleep disturbances-denies.      /63 (BP Location: Right arm, Patient Position: Sitting, BP Cuff Size: Small adult)   Pulse 84   Wt 55.2 kg (121 lb 12.8 oz)   BMI 21.58 kg/m²       Labs:  Lab Results   Component Value Date    WBC 4.4 10/24/2023    NRBC 0.0 10/24/2023    RBC 3.91 (L) 10/24/2023    HGB 11.5 (L) 10/24/2023    HCT 36.3 10/24/2023     10/24/2023     Lab Results   Component Value Date    CALCIUM 9.6 02/16/2024    AST 10 10/24/2023    ALKPHOS 55 10/24/2023    BILITOT 0.6 10/24/2023    PROT 6.3 (L) 10/24/2023    ALBUMIN 4.2 10/24/2023    GLOB 2.4 11/06/2021    AGR 1.9 11/06/2021     10/24/2023    K 4.3 10/24/2023     10/24/2023    CO2 28 10/24/2023    ANIONGAP 12 10/24/2023    BUN 13 10/24/2023    CREATININE 0.56 10/24/2023    UREACREAUR 21.7 (H) 11/06/2021    GLUCOSE 100 (H) 10/24/2023    ALT 9 10/24/2023    EGFR >90 10/24/2023     Lab Results   Component Value Date    CHOL 154 10/24/2023    TRIG 58 10/24/2023    HDL 74.2 10/24/2023    LDLCALC 68 10/24/2023     Lab Results   Component Value Date    MICROALBCREA  10/24/2023      Comment:      One or more analytes used in this calculation is outside of  the analytical measurement range. Calculation cannot be performed.     Lab Results   Component Value Date    TSH 2.45 10/24/2023     Lab Results   Component Value Date    LOJWAMFC35 235 10/24/2023     Lab Results   Component Value Date    HGBA1C 6.5 04/26/2024         Physical Exam   General Appearance: pleasant, cooperative, no acute distress  HEENT: no chemosis, no proptosis, no lid lag, no lid retraction  Neck: no lymphadenopathy, no thyromegaly, no dominant thyroid nodules  Heart: no murmurs, regular rate and rhythm, S1 and S2  Lungs: no wheezes, no rhonci, no rales  Extremities: no lower extremity swelling      Assessment/Plan   1. Type 2 diabetes mellitus with hyperglycemia, without long-term current use of insulin (Multi)  -A1c ordered and reviewed  -torito data reviewed  -labs reviewed, labs ordered  -refills sent    -no change in regiment, call if wt<120lbs would need to lower/change ozempic  -if overnight lows, lower insulin in 2 unit intervals for sugars <100    2. Mixed hyperlipidemia  -on statin and tolerating  -labs ordered prior to next visit    3. Syncope  -stay well hydrated         Follow Up:      -labs/tests/notes reviewed  -reviewed and counseled patient on medication monitoring and side effects

## 2024-04-26 ENCOUNTER — PHARMACY VISIT (OUTPATIENT)
Dept: PHARMACY | Facility: CLINIC | Age: 64
End: 2024-04-26
Payer: COMMERCIAL

## 2024-04-26 ENCOUNTER — OFFICE VISIT (OUTPATIENT)
Dept: ENDOCRINOLOGY | Facility: CLINIC | Age: 64
End: 2024-04-26
Payer: MEDICARE

## 2024-04-26 VITALS
BODY MASS INDEX: 21.58 KG/M2 | HEART RATE: 84 BPM | SYSTOLIC BLOOD PRESSURE: 104 MMHG | DIASTOLIC BLOOD PRESSURE: 63 MMHG | WEIGHT: 121.8 LBS

## 2024-04-26 DIAGNOSIS — E11.65 TYPE 2 DIABETES MELLITUS WITH HYPERGLYCEMIA, UNSPECIFIED WHETHER LONG TERM INSULIN USE (MULTI): ICD-10-CM

## 2024-04-26 DIAGNOSIS — E10.65 TYPE 1 DIABETES MELLITUS WITH HYPERGLYCEMIA (MULTI): ICD-10-CM

## 2024-04-26 DIAGNOSIS — E11.65 TYPE 2 DIABETES MELLITUS WITH HYPERGLYCEMIA, WITHOUT LONG-TERM CURRENT USE OF INSULIN (MULTI): Primary | ICD-10-CM

## 2024-04-26 DIAGNOSIS — E78.2 MIXED HYPERLIPIDEMIA: ICD-10-CM

## 2024-04-26 LAB — POC HEMOGLOBIN A1C: 6.5 % (ref 4.2–6.5)

## 2024-04-26 PROCEDURE — 1036F TOBACCO NON-USER: CPT | Performed by: INTERNAL MEDICINE

## 2024-04-26 PROCEDURE — 83036 HEMOGLOBIN GLYCOSYLATED A1C: CPT | Performed by: INTERNAL MEDICINE

## 2024-04-26 PROCEDURE — 1126F AMNT PAIN NOTED NONE PRSNT: CPT | Performed by: INTERNAL MEDICINE

## 2024-04-26 PROCEDURE — RXMED WILLOW AMBULATORY MEDICATION CHARGE

## 2024-04-26 PROCEDURE — 3074F SYST BP LT 130 MM HG: CPT | Performed by: INTERNAL MEDICINE

## 2024-04-26 PROCEDURE — 99214 OFFICE O/P EST MOD 30 MIN: CPT | Performed by: INTERNAL MEDICINE

## 2024-04-26 PROCEDURE — 1160F RVW MEDS BY RX/DR IN RCRD: CPT | Performed by: INTERNAL MEDICINE

## 2024-04-26 PROCEDURE — 3078F DIAST BP <80 MM HG: CPT | Performed by: INTERNAL MEDICINE

## 2024-04-26 PROCEDURE — 1159F MED LIST DOCD IN RCRD: CPT | Performed by: INTERNAL MEDICINE

## 2024-04-26 RX ORDER — PRAVASTATIN SODIUM 40 MG/1
40 TABLET ORAL DAILY
Qty: 90 TABLET | Refills: 3 | Status: SHIPPED | OUTPATIENT
Start: 2024-04-26

## 2024-04-26 RX ORDER — INSULIN DEGLUDEC 100 U/ML
INJECTION, SOLUTION SUBCUTANEOUS
Qty: 30 ML | Refills: 3 | Status: SHIPPED | OUTPATIENT
Start: 2024-04-26

## 2024-04-26 ASSESSMENT — PAIN SCALES - GENERAL: PAINLEVEL: 0-NO PAIN

## 2024-04-26 ASSESSMENT — ENCOUNTER SYMPTOMS: DEPRESSION: 0

## 2024-05-05 PROCEDURE — RXMED WILLOW AMBULATORY MEDICATION CHARGE

## 2024-05-07 ENCOUNTER — PHARMACY VISIT (OUTPATIENT)
Dept: PHARMACY | Facility: CLINIC | Age: 64
End: 2024-05-07
Payer: COMMERCIAL

## 2024-05-30 ENCOUNTER — SPECIALTY PHARMACY (OUTPATIENT)
Dept: PHARMACY | Facility: CLINIC | Age: 64
End: 2024-05-30

## 2024-07-22 PROCEDURE — RXMED WILLOW AMBULATORY MEDICATION CHARGE

## 2024-07-24 ENCOUNTER — PHARMACY VISIT (OUTPATIENT)
Dept: PHARMACY | Facility: CLINIC | Age: 64
End: 2024-07-24
Payer: COMMERCIAL

## 2024-07-26 ENCOUNTER — TELEPHONE (OUTPATIENT)
Dept: ENDOCRINOLOGY | Facility: CLINIC | Age: 64
End: 2024-07-26
Payer: MEDICARE

## 2024-07-26 NOTE — TELEPHONE ENCOUNTER
"Patient saw you back in April and talked of \"passing out\" at [place] --possibly dehydrated--this happened again this past Wednesday at [facility] --pt concerned what to do --she has been taking her medications and eating and going about usual days --sometimes does feel light-headed -her PCP is Dr. Cano ---please advise  "

## 2024-07-27 PROBLEM — M81.0 SENILE OSTEOPOROSIS: Status: ACTIVE | Noted: 2024-07-27

## 2024-07-30 NOTE — PROGRESS NOTES
Subjective   Patient ID: Amaya Quiroz is a 64 y.o. female who presents for Syncope.    HPI   Pt comes in c.o syncope twice in the last 6 months.  Once while at [employer] in March which she chalked up as dehydration -she felt strange and the second instance at her gym last week: she was doing wt lifting 3 lbs above her head when she passed out again: no presyncopal sxs. ; she is on ozempic for diabetes and has lost weight and endo thinks it may be due to not eating enough so they stopped ozempic for a week but wanted further w/u (neuro/cardio).     Past Medical History:   Diagnosis Date    Bilateral cataracts 09/14/2023    Carpal tunnel syndrome 01/05/2017    History of COVID-19 09/2022    Mixed hyperlipidemia     Senile osteoporosis 07/27/2024    Type 2 diabetes mellitus with hyperglycemia, without long-term current use of insulin (Multi)      Current Outpatient Medications   Medication Sig Dispense Refill    metFORMIN  mg 24 hr tablet Take 2 tablets by mouth twice daily 360 tablet 3    ONETOUCH DELICA LANCETS MISC once daily. Test as directed      Ozempic 0.25 mg or 0.5 mg (2 mg/3 mL) pen injector Inject 0.5 mg under the skin 1 (one) time per week. 9 mL 3    pravastatin (Pravachol) 40 mg tablet Take 1 tablet (40 mg) by mouth once daily. 90 tablet 3    Tresiba FlexTouch U-100 100 unit/mL (3 mL) injection Take as directed up to 30 units total daily 30 mL 3     Current Facility-Administered Medications   Medication Dose Route Frequency Provider Last Rate Last Admin    denosumab (Prolia) injection 60 mg  60 mg subcutaneous q6 months Jen Cano MD   60 mg at 02/20/24 1004       Review of Systems see hpi    Objective   /64   Pulse 86   Wt 55.2 kg (121 lb 12.8 oz)   SpO2 99%   BMI 21.58 kg/m²     Physical Exam  Vitals reviewed.   Constitutional:       Appearance: Normal appearance.   HENT:      Head: Normocephalic and atraumatic.   Cardiovascular:      Rate and Rhythm: Normal rate and regular rhythm.       Heart sounds: Normal heart sounds.   Pulmonary:      Effort: Pulmonary effort is normal.      Breath sounds: Normal breath sounds.   Neurological:      Mental Status: She is alert.      Deep Tendon Reflexes: Reflexes are normal and symmetric.       Assessment/Plan   Problem List Items Addressed This Visit    None  Visit Diagnoses         Codes    Syncope, unspecified syncope type    -  Primary R55    Relevant Orders    ECG 12 Lead    CT head wo IV contrast    CBC    Comprehensive Metabolic Panel    Magnesium    Need for vaccination     Z23        Orthostatic pressures:  lying down 154/70; sitting 144/70; standing 140/70    If labs are normal as well as head ct will refer to electrophysiology for event monitor

## 2024-08-01 ENCOUNTER — LAB (OUTPATIENT)
Dept: LAB | Facility: LAB | Age: 64
End: 2024-08-01
Payer: MEDICARE

## 2024-08-01 ENCOUNTER — OFFICE VISIT (OUTPATIENT)
Dept: PRIMARY CARE | Facility: CLINIC | Age: 64
End: 2024-08-01
Payer: MEDICARE

## 2024-08-01 VITALS
BODY MASS INDEX: 21.58 KG/M2 | DIASTOLIC BLOOD PRESSURE: 64 MMHG | HEART RATE: 86 BPM | WEIGHT: 121.8 LBS | SYSTOLIC BLOOD PRESSURE: 120 MMHG | OXYGEN SATURATION: 99 %

## 2024-08-01 DIAGNOSIS — Z23 NEED FOR VACCINATION: ICD-10-CM

## 2024-08-01 DIAGNOSIS — D64.9 ANEMIA, UNSPECIFIED TYPE: Primary | ICD-10-CM

## 2024-08-01 DIAGNOSIS — R55 SYNCOPE, UNSPECIFIED SYNCOPE TYPE: ICD-10-CM

## 2024-08-01 DIAGNOSIS — D64.9 ANEMIA, UNSPECIFIED TYPE: ICD-10-CM

## 2024-08-01 DIAGNOSIS — R55 SYNCOPE, UNSPECIFIED SYNCOPE TYPE: Primary | ICD-10-CM

## 2024-08-01 LAB
ALBUMIN SERPL BCP-MCNC: 4.2 G/DL (ref 3.4–5)
ALP SERPL-CCNC: 42 U/L (ref 33–136)
ALT SERPL W P-5'-P-CCNC: 8 U/L (ref 7–45)
ANION GAP SERPL CALC-SCNC: 11 MMOL/L (ref 10–20)
AST SERPL W P-5'-P-CCNC: 9 U/L (ref 9–39)
BILIRUB SERPL-MCNC: 0.5 MG/DL (ref 0–1.2)
BUN SERPL-MCNC: 12 MG/DL (ref 6–23)
CALCIUM SERPL-MCNC: 9.3 MG/DL (ref 8.6–10.3)
CHLORIDE SERPL-SCNC: 100 MMOL/L (ref 98–107)
CO2 SERPL-SCNC: 29 MMOL/L (ref 21–32)
CREAT SERPL-MCNC: 0.63 MG/DL (ref 0.5–1.05)
EGFRCR SERPLBLD CKD-EPI 2021: >90 ML/MIN/1.73M*2
ERYTHROCYTE [DISTWIDTH] IN BLOOD BY AUTOMATED COUNT: 12.6 % (ref 11.5–14.5)
FERRITIN SERPL-MCNC: 23 NG/ML (ref 8–150)
GLUCOSE SERPL-MCNC: 187 MG/DL (ref 74–99)
HCT VFR BLD AUTO: 34.7 % (ref 36–46)
HGB BLD-MCNC: 10.9 G/DL (ref 12–16)
IRON SATN MFR SERPL: 11 % (ref 25–45)
IRON SERPL-MCNC: 45 UG/DL (ref 35–150)
MAGNESIUM SERPL-MCNC: 1.76 MG/DL (ref 1.6–2.4)
MCH RBC QN AUTO: 28.2 PG (ref 26–34)
MCHC RBC AUTO-ENTMCNC: 31.4 G/DL (ref 32–36)
MCV RBC AUTO: 90 FL (ref 80–100)
NRBC BLD-RTO: 0 /100 WBCS (ref 0–0)
PLATELET # BLD AUTO: 308 X10*3/UL (ref 150–450)
POTASSIUM SERPL-SCNC: 4.2 MMOL/L (ref 3.5–5.3)
PROT SERPL-MCNC: 6.3 G/DL (ref 6.4–8.2)
RBC # BLD AUTO: 3.86 X10*6/UL (ref 4–5.2)
SODIUM SERPL-SCNC: 136 MMOL/L (ref 136–145)
TIBC SERPL-MCNC: 411 UG/DL (ref 240–445)
UIBC SERPL-MCNC: 366 UG/DL (ref 110–370)
WBC # BLD AUTO: 4.5 X10*3/UL (ref 4.4–11.3)

## 2024-08-01 PROCEDURE — 99214 OFFICE O/P EST MOD 30 MIN: CPT | Performed by: FAMILY MEDICINE

## 2024-08-01 PROCEDURE — 1158F ADVNC CARE PLAN TLK DOCD: CPT | Performed by: FAMILY MEDICINE

## 2024-08-01 PROCEDURE — 99214 OFFICE O/P EST MOD 30 MIN: CPT | Mod: 25 | Performed by: FAMILY MEDICINE

## 2024-08-01 PROCEDURE — 82746 ASSAY OF FOLIC ACID SERUM: CPT

## 2024-08-01 PROCEDURE — 82607 VITAMIN B-12: CPT

## 2024-08-01 PROCEDURE — 90677 PCV20 VACCINE IM: CPT | Performed by: FAMILY MEDICINE

## 2024-08-01 PROCEDURE — 1159F MED LIST DOCD IN RCRD: CPT | Performed by: FAMILY MEDICINE

## 2024-08-01 PROCEDURE — 36415 COLL VENOUS BLD VENIPUNCTURE: CPT

## 2024-08-01 PROCEDURE — 93010 ELECTROCARDIOGRAM REPORT: CPT | Performed by: FAMILY MEDICINE

## 2024-08-01 PROCEDURE — 1126F AMNT PAIN NOTED NONE PRSNT: CPT | Performed by: FAMILY MEDICINE

## 2024-08-01 PROCEDURE — 93005 ELECTROCARDIOGRAM TRACING: CPT | Performed by: FAMILY MEDICINE

## 2024-08-01 PROCEDURE — 3078F DIAST BP <80 MM HG: CPT | Performed by: FAMILY MEDICINE

## 2024-08-01 PROCEDURE — 1123F ACP DISCUSS/DSCN MKR DOCD: CPT | Performed by: FAMILY MEDICINE

## 2024-08-01 PROCEDURE — 1036F TOBACCO NON-USER: CPT | Performed by: FAMILY MEDICINE

## 2024-08-01 PROCEDURE — 3074F SYST BP LT 130 MM HG: CPT | Performed by: FAMILY MEDICINE

## 2024-08-01 ASSESSMENT — PATIENT HEALTH QUESTIONNAIRE - PHQ9
2. FEELING DOWN, DEPRESSED OR HOPELESS: NOT AT ALL
SUM OF ALL RESPONSES TO PHQ9 QUESTIONS 1 AND 2: 0
1. LITTLE INTEREST OR PLEASURE IN DOING THINGS: NOT AT ALL

## 2024-08-01 ASSESSMENT — PAIN SCALES - GENERAL: PAINLEVEL: 0-NO PAIN

## 2024-08-01 NOTE — RESULT ENCOUNTER NOTE
Iron levels are low normal; would rec beginning otc iron 325 mg daily and will yashira hgb/hct in 3 mos.

## 2024-08-01 NOTE — RESULT ENCOUNTER NOTE
Pt is anemic and I've ordered more labs to see if she has an iron deficiency or vitamin deficiency.  This could be one of the causes of her syncope

## 2024-08-02 DIAGNOSIS — D64.9 ANEMIA, UNSPECIFIED TYPE: Primary | ICD-10-CM

## 2024-08-02 LAB
FOLATE SERPL-MCNC: 16.1 NG/ML
VIT B12 SERPL-MCNC: 211 PG/ML (ref 211–911)

## 2024-08-02 NOTE — RESULT ENCOUNTER NOTE
Folate is normal but b12 is borderline deficient so start otc vit b12 500 mcg daily and will yashira again in 3 mos; this could cause anemia as well

## 2024-08-05 ENCOUNTER — HOSPITAL ENCOUNTER (OUTPATIENT)
Dept: RADIOLOGY | Facility: CLINIC | Age: 64
Discharge: HOME | End: 2024-08-05
Payer: MEDICARE

## 2024-08-05 DIAGNOSIS — R55 SYNCOPE, UNSPECIFIED SYNCOPE TYPE: ICD-10-CM

## 2024-08-05 PROCEDURE — 70450 CT HEAD/BRAIN W/O DYE: CPT | Performed by: RADIOLOGY

## 2024-08-05 PROCEDURE — 70450 CT HEAD/BRAIN W/O DYE: CPT

## 2024-08-06 PROCEDURE — RXMED WILLOW AMBULATORY MEDICATION CHARGE

## 2024-08-07 ENCOUNTER — PHARMACY VISIT (OUTPATIENT)
Dept: PHARMACY | Facility: CLINIC | Age: 64
End: 2024-08-07
Payer: COMMERCIAL

## 2024-08-19 ENCOUNTER — TELEPHONE (OUTPATIENT)
Dept: PRIMARY CARE | Facility: CLINIC | Age: 64
End: 2024-08-19
Payer: MEDICARE

## 2024-08-19 DIAGNOSIS — M81.0 SENILE OSTEOPOROSIS: Primary | ICD-10-CM

## 2024-08-19 RX ORDER — ALBUTEROL SULFATE 0.83 MG/ML
3 SOLUTION RESPIRATORY (INHALATION) AS NEEDED
OUTPATIENT
Start: 2024-08-19

## 2024-08-19 RX ORDER — ACETAMINOPHEN 325 MG/1
650 TABLET ORAL ONCE
OUTPATIENT
Start: 2024-08-19

## 2024-08-19 RX ORDER — EPINEPHRINE 0.3 MG/.3ML
0.3 INJECTION SUBCUTANEOUS EVERY 5 MIN PRN
OUTPATIENT
Start: 2024-08-19

## 2024-08-19 RX ORDER — DIPHENHYDRAMINE HYDROCHLORIDE 50 MG/ML
50 INJECTION INTRAMUSCULAR; INTRAVENOUS AS NEEDED
OUTPATIENT
Start: 2024-08-19

## 2024-08-19 RX ORDER — FAMOTIDINE 10 MG/ML
20 INJECTION INTRAVENOUS ONCE AS NEEDED
OUTPATIENT
Start: 2024-08-19

## 2024-08-20 ENCOUNTER — APPOINTMENT (OUTPATIENT)
Dept: PRIMARY CARE | Facility: CLINIC | Age: 64
End: 2024-08-20
Payer: MEDICARE

## 2024-08-21 PROCEDURE — RXMED WILLOW AMBULATORY MEDICATION CHARGE

## 2024-08-23 ENCOUNTER — TELEPHONE (OUTPATIENT)
Dept: PRIMARY CARE | Facility: CLINIC | Age: 64
End: 2024-08-23
Payer: MEDICARE

## 2024-08-23 ENCOUNTER — PHARMACY VISIT (OUTPATIENT)
Dept: PHARMACY | Facility: CLINIC | Age: 64
End: 2024-08-23
Payer: COMMERCIAL

## 2024-09-03 DIAGNOSIS — E11.65 TYPE 2 DIABETES MELLITUS WITH HYPERGLYCEMIA, WITHOUT LONG-TERM CURRENT USE OF INSULIN (MULTI): Primary | ICD-10-CM

## 2024-09-03 PROCEDURE — RXMED WILLOW AMBULATORY MEDICATION CHARGE

## 2024-09-03 RX ORDER — PEN NEEDLE, DIABETIC 30 GX3/16"
NEEDLE, DISPOSABLE MISCELLANEOUS
Qty: 100 EACH | Refills: 3 | Status: SHIPPED | OUTPATIENT
Start: 2024-09-03 | End: 2025-09-03

## 2024-09-04 ENCOUNTER — PHARMACY VISIT (OUTPATIENT)
Dept: PHARMACY | Facility: CLINIC | Age: 64
End: 2024-09-04
Payer: COMMERCIAL

## 2024-09-30 ENCOUNTER — LAB (OUTPATIENT)
Dept: LAB | Facility: LAB | Age: 64
End: 2024-09-30
Payer: MEDICARE

## 2024-09-30 DIAGNOSIS — M81.0 SENILE OSTEOPOROSIS: ICD-10-CM

## 2024-09-30 LAB
ALBUMIN SERPL BCP-MCNC: 4 G/DL (ref 3.4–5)
ALP SERPL-CCNC: 43 U/L (ref 33–136)
ALT SERPL W P-5'-P-CCNC: 9 U/L (ref 7–45)
ANION GAP SERPL CALC-SCNC: 12 MMOL/L (ref 10–20)
AST SERPL W P-5'-P-CCNC: 12 U/L (ref 9–39)
BILIRUB SERPL-MCNC: 0.5 MG/DL (ref 0–1.2)
BUN SERPL-MCNC: 15 MG/DL (ref 6–23)
CALCIUM SERPL-MCNC: 9.1 MG/DL (ref 8.6–10.3)
CHLORIDE SERPL-SCNC: 103 MMOL/L (ref 98–107)
CO2 SERPL-SCNC: 27 MMOL/L (ref 21–32)
CREAT SERPL-MCNC: 0.66 MG/DL (ref 0.5–1.05)
EGFRCR SERPLBLD CKD-EPI 2021: >90 ML/MIN/1.73M*2
GLUCOSE SERPL-MCNC: 87 MG/DL (ref 74–99)
POTASSIUM SERPL-SCNC: 4.3 MMOL/L (ref 3.5–5.3)
PROT SERPL-MCNC: 6.2 G/DL (ref 6.4–8.2)
SODIUM SERPL-SCNC: 138 MMOL/L (ref 136–145)

## 2024-09-30 PROCEDURE — 36415 COLL VENOUS BLD VENIPUNCTURE: CPT

## 2024-10-01 ENCOUNTER — APPOINTMENT (OUTPATIENT)
Dept: INFUSION THERAPY | Facility: CLINIC | Age: 64
End: 2024-10-01
Payer: MEDICARE

## 2024-10-01 DIAGNOSIS — M81.0 SENILE OSTEOPOROSIS: ICD-10-CM

## 2024-10-01 PROCEDURE — 96372 THER/PROPH/DIAG INJ SC/IM: CPT | Performed by: NURSE PRACTITIONER

## 2024-10-01 RX ORDER — EPINEPHRINE 0.3 MG/.3ML
0.3 INJECTION SUBCUTANEOUS EVERY 5 MIN PRN
OUTPATIENT
Start: 2025-03-29

## 2024-10-01 RX ORDER — ALBUTEROL SULFATE 0.83 MG/ML
3 SOLUTION RESPIRATORY (INHALATION) AS NEEDED
OUTPATIENT
Start: 2025-03-29

## 2024-10-01 RX ORDER — DIPHENHYDRAMINE HYDROCHLORIDE 50 MG/ML
50 INJECTION INTRAMUSCULAR; INTRAVENOUS AS NEEDED
OUTPATIENT
Start: 2025-03-29

## 2024-10-01 RX ORDER — FAMOTIDINE 10 MG/ML
20 INJECTION INTRAVENOUS ONCE AS NEEDED
OUTPATIENT
Start: 2025-03-29

## 2024-10-01 RX ORDER — ACETAMINOPHEN 325 MG/1
650 TABLET ORAL ONCE
OUTPATIENT
Start: 2025-03-29

## 2024-10-01 NOTE — PROGRESS NOTES
[facility]   Infusion Clinic Note   Date: 2024   Name: Amaya Quiroz  : 1960   MRN: [id number]          Reason for Visit: No chief complaint on file.         Today: We administered denosumab.       Visit Type: INJECTION       Ordered By: Jerome       Accompanied by:Self       Diagnosis: Senile osteoporosis        Allergies:   Allergies as of 10/01/2024    (No Known Allergies)          Current Medications has a current medication list which includes the following prescription(s): metformin xr, lancets, ozempic, pen needle, diabetic, pravastatin, and tresiba flextouch u-100, and the following Facility-Administered Medications: denosumab.       Vitals:   There were no vitals filed for this visit.          Infusion Pre-procedure Checklist:   - Allergies reviewed: yes   - Medications reviewed: yes       - Previous reaction to current treatment: no      Assess patient for the concerns below. Document provider notification as appropriate.  - Active or recent infection with/without current antibiotic use: no  - Recent or planned invasive dental work: no  - Recent or planned surgeries: no  - Recently received or plans to receive vaccinations: no  - Has treatment related toxicities: no  - Is pregnant:  no      Pain: 0   - Is the pain different from normal: no   - Is your Doctor aware:  no       Labs: N/A          Fall Risk Screening:         Review Of Systems:  Review of Systems   All other systems reviewed and are negative.        ROS completed? Yes      Infusion Readiness:  - Assessment Concerns Related to Infusion: No  - Provider notified: no      Document Below Only If Indicated:   New Patient Education:    N/A (returning patient for continuation of therapy. Ongoing education provided as needed.)        Treatment Conditions & Drug Specific Questions:    Denosumab  (PROLIA. XGEVA)    (Unless otherwise specified on patient specific therapy plan):     TREATMENT CONDITIONS:  Unless  "otherwise specified on patient specific therapy plan HOLD and notify provider prior to proceeding with today's injection if patients:  o Calcium is LESS THAN 8.6 mg/dL OR  Ionized Calcium LESS THAN 1.1 mmol/L  o Recent or planned invasive dental procedure (within 4 weeks)    Lab Results   Component Value Date    CALCIUM 9.1 09/30/2024      No results found for: \"CAION\"    Labs reviewed and patient meets treatment conditions? Yes    DRUG SPECIFIC QUESTIONS:  Is the patient taking calcium and vitamin D? Yes  (Recommended)    Pt Instructed on following risks: (1) hypocalcemia, (2) osteonecrosis of the jaw, (3) atypical femoral fractures, (4) serious infections, and (5) dermatologic reactions?  Yes      REMINDER:  PREGNANCY CATEGORY X DRUG. OBTAIN NEGTATIVE PREGNANCY TEST PRIOR TO FIRST INFUSION FOR WOMEN OF CHILDBEARING ABILITY   REMS DRUG    Recommended Vitals/Observation:  Vitals: Obtain vitals prior to injection.  Observation: Patient may leave immediately following injection.        Weight Based Drug Calculations:    WEIGHT BASED DRUGS: NOT APPLICABLE / FLAT DOSE          Initiated By: Lily Diez RN   Patient visit conducted today by Lily Diez RN for administration of Prolia Subcutaneous injection administered in right arm(s) and well-tolerated.      "

## 2024-10-01 NOTE — PATIENT INSTRUCTIONS
Today :Amaya Quiroz had no medications administered during this visit.     For:   1. Senile osteoporosis         Your next appointment is due in:  4/1/24        Please read the  Medication Guide that was given to you and reviewed during todays visit.     (Tell all doctors including dentists that you are taking this medication)     Go to the emergency room or call 911 if:  -You have signs of allergic reaction:   -Rash, hives, itching.   -Swollen, blistered, peeling skin.   -Swelling of face, lips, mouth, tongue or throat.   -Tightness of chest, trouble breathing, swallowing or talking     Call your doctor:  - If IV / injection site gets red, warm, swollen, itchy or leaks fluid or pus.     (Leave dressing on your IV site for at least 2 hours and keep area clean and dry  - If you get sick or have symptoms of infection or are not feeling well for any reason.    (Wash your hands often, stay away from people who are sick)  - If you have side effects from your medication that do not go away or are bothersome.     (Refer to the teaching your nurse gave you for side effects to call your doctor about)    - Common side effects may include:  stuffy nose, headache, feeling tired, muscle aches, upset stomach  - Before receiving any vaccines     - Call the [facility] at   If:  - You get sick, are on antibiotics, have had a recent vaccine, have surgery or dental work and your doctor wants your visit rescheduled.  - You need to cancel and reschedule your visit for any reason. Call at least 2 days before your visit if you need to cancel.   - Your insurance changes before your next visit.    (We will need to get approval from your new insurance. This can take up to two weeks.)     The [facility] is opened Monday thru Friday. We are closed on weekends and holidays.   Voice mail will take your call if the center is closed. If you leave a message please allow 24 hours for a call back during  weekdays. If you leave a message on a weekend/holiday, we will call you back the next business day.

## 2024-10-11 ENCOUNTER — TELEPHONE (OUTPATIENT)
Dept: ENDOCRINOLOGY | Facility: CLINIC | Age: 64
End: 2024-10-11
Payer: MEDICARE

## 2024-10-30 DIAGNOSIS — E11.65 TYPE 2 DIABETES MELLITUS WITH HYPERGLYCEMIA, WITHOUT LONG-TERM CURRENT USE OF INSULIN: Primary | ICD-10-CM

## 2024-10-30 PROCEDURE — RXMED WILLOW AMBULATORY MEDICATION CHARGE

## 2024-10-30 RX ORDER — METFORMIN HYDROCHLORIDE 500 MG/1
1000 TABLET, EXTENDED RELEASE ORAL 2 TIMES DAILY
Qty: 360 TABLET | Refills: 3 | Status: SHIPPED | OUTPATIENT
Start: 2024-10-30

## 2024-11-01 ENCOUNTER — APPOINTMENT (OUTPATIENT)
Dept: ENDOCRINOLOGY | Facility: CLINIC | Age: 64
End: 2024-11-01
Payer: MEDICARE

## 2024-11-01 ENCOUNTER — PHARMACY VISIT (OUTPATIENT)
Dept: PHARMACY | Facility: CLINIC | Age: 64
End: 2024-11-01
Payer: COMMERCIAL

## 2024-11-04 PROCEDURE — RXMED WILLOW AMBULATORY MEDICATION CHARGE

## 2024-11-05 ENCOUNTER — PHARMACY VISIT (OUTPATIENT)
Dept: PHARMACY | Facility: CLINIC | Age: 64
End: 2024-11-05
Payer: COMMERCIAL

## 2024-11-07 ENCOUNTER — PHARMACY VISIT (OUTPATIENT)
Dept: PHARMACY | Facility: CLINIC | Age: 64
End: 2024-11-07
Payer: COMMERCIAL

## 2024-11-14 ENCOUNTER — APPOINTMENT (OUTPATIENT)
Dept: ENDOCRINOLOGY | Facility: CLINIC | Age: 64
End: 2024-11-14
Payer: MEDICARE

## 2024-11-21 ENCOUNTER — LAB (OUTPATIENT)
Dept: LAB | Facility: LAB | Age: 64
End: 2024-11-21
Payer: MEDICARE

## 2024-11-21 DIAGNOSIS — D64.9 ANEMIA, UNSPECIFIED TYPE: ICD-10-CM

## 2024-11-21 DIAGNOSIS — E78.2 MIXED HYPERLIPIDEMIA: ICD-10-CM

## 2024-11-21 DIAGNOSIS — M81.0 SENILE OSTEOPOROSIS: ICD-10-CM

## 2024-11-21 DIAGNOSIS — E11.65 TYPE 2 DIABETES MELLITUS WITH HYPERGLYCEMIA, WITHOUT LONG-TERM CURRENT USE OF INSULIN: ICD-10-CM

## 2024-11-21 LAB
ALBUMIN SERPL BCP-MCNC: 4.2 G/DL (ref 3.4–5)
ALP SERPL-CCNC: 44 U/L (ref 33–136)
ALT SERPL W P-5'-P-CCNC: 8 U/L (ref 7–45)
ANION GAP SERPL CALC-SCNC: 11 MMOL/L (ref 10–20)
AST SERPL W P-5'-P-CCNC: 11 U/L (ref 9–39)
BASOPHILS # BLD AUTO: 0.04 X10*3/UL (ref 0–0.1)
BASOPHILS NFR BLD AUTO: 0.7 %
BILIRUB SERPL-MCNC: 0.4 MG/DL (ref 0–1.2)
BUN SERPL-MCNC: 15 MG/DL (ref 6–23)
CA-I BLD-SCNC: 1.17 MMOL/L (ref 1.1–1.33)
CALCIUM SERPL-MCNC: 8.7 MG/DL (ref 8.6–10.3)
CHLORIDE SERPL-SCNC: 101 MMOL/L (ref 98–107)
CHOLEST SERPL-MCNC: 150 MG/DL (ref 0–199)
CHOLESTEROL/HDL RATIO: 2
CO2 SERPL-SCNC: 26 MMOL/L (ref 21–32)
CREAT SERPL-MCNC: 0.63 MG/DL (ref 0.5–1.05)
EGFRCR SERPLBLD CKD-EPI 2021: >90 ML/MIN/1.73M*2
EOSINOPHIL # BLD AUTO: 0.11 X10*3/UL (ref 0–0.7)
EOSINOPHIL NFR BLD AUTO: 1.8 %
ERYTHROCYTE [DISTWIDTH] IN BLOOD BY AUTOMATED COUNT: 12.4 % (ref 11.5–14.5)
FERRITIN SERPL-MCNC: 30 NG/ML (ref 8–150)
GLUCOSE SERPL-MCNC: 81 MG/DL (ref 74–99)
HCT VFR BLD AUTO: 36.1 % (ref 36–46)
HDLC SERPL-MCNC: 76.1 MG/DL
HGB BLD-MCNC: 11.6 G/DL (ref 12–16)
IMM GRANULOCYTES # BLD AUTO: 0.02 X10*3/UL (ref 0–0.7)
IMM GRANULOCYTES NFR BLD AUTO: 0.3 % (ref 0–0.9)
LDLC SERPL CALC-MCNC: 64 MG/DL
LYMPHOCYTES # BLD AUTO: 1.81 X10*3/UL (ref 1.2–4.8)
LYMPHOCYTES NFR BLD AUTO: 30.3 %
MCH RBC QN AUTO: 29.3 PG (ref 26–34)
MCHC RBC AUTO-ENTMCNC: 32.1 G/DL (ref 32–36)
MCV RBC AUTO: 91 FL (ref 80–100)
MONOCYTES # BLD AUTO: 0.57 X10*3/UL (ref 0.1–1)
MONOCYTES NFR BLD AUTO: 9.5 %
NEUTROPHILS # BLD AUTO: 3.42 X10*3/UL (ref 1.2–7.7)
NEUTROPHILS NFR BLD AUTO: 57.4 %
NON HDL CHOLESTEROL: 74 MG/DL (ref 0–149)
NRBC BLD-RTO: 0 /100 WBCS (ref 0–0)
PLATELET # BLD AUTO: 305 X10*3/UL (ref 150–450)
POTASSIUM SERPL-SCNC: 3.9 MMOL/L (ref 3.5–5.3)
PROT SERPL-MCNC: 6.8 G/DL (ref 6.4–8.2)
RBC # BLD AUTO: 3.96 X10*6/UL (ref 4–5.2)
SODIUM SERPL-SCNC: 134 MMOL/L (ref 136–145)
TRIGL SERPL-MCNC: 50 MG/DL (ref 0–149)
VIT B12 SERPL-MCNC: 770 PG/ML (ref 211–911)
VLDL: 10 MG/DL (ref 0–40)
WBC # BLD AUTO: 6 X10*3/UL (ref 4.4–11.3)

## 2024-11-21 PROCEDURE — 82330 ASSAY OF CALCIUM: CPT

## 2024-11-21 PROCEDURE — 36415 COLL VENOUS BLD VENIPUNCTURE: CPT

## 2024-11-21 PROCEDURE — 82607 VITAMIN B-12: CPT

## 2024-11-21 NOTE — PROGRESS NOTES
"HPI   64 yo with  Diabetes 2 (workup for late onset type 1 neg), Dyslipidemia, highest wt 200 lbs presents for followup. A1c-6.5% last visit, today 6.3%.      Pt is testing sugars 4 times per day with torito 3. Pt is having low sugars 0 times/week. Pt is following a carb controlled diet and knows reasonable carb allowances. Pt is able to afford their medications. Pt is exercising walking 3 miles/day.           Pt taking metformin,  ozempic 0.25 (decreased from 0.5 and 1mg as too much wt loss), 10 units tresiba every day   -jardiance came off  due to repeat mycotic infections.  -call office if wt goes <120 and we will lower/stop ozempic and call if over 140 lbs     14 days torito 3 data: 72% in range, 4% low, pattern: low 100's overnight into waking (at times <100), after breakfast mid 100's and through the day, around 100 at bedtime.           Taking pravastatin 40mg for primary prevention.     -pt had loc while at [employer] recently, happened in am, 1-2 hrs post breakfast, no low sugar, pt was waiting in line          Current Outpatient Medications:     metFORMIN  mg 24 hr tablet, Take 2 tablets (1,000 mg) by mouth 2 times a day., Disp: 360 tablet, Rfl: 3    ONETOUCH DELICA LANCETS MISC, once daily. Test as directed, Disp: , Rfl:     Ozempic 0.25 mg or 0.5 mg (2 mg/3 mL) pen injector, Inject 0.5 mg under the skin 1 (one) time per week., Disp: 9 mL, Rfl: 3    pen needle, diabetic (BD Ultra-Fine Maggi Pen Needle) 32 gauge x 5/32\" needle, USE AS DIRECTED DAILY, Disp: 100 each, Rfl: 3    pravastatin (Pravachol) 40 mg tablet, Take 1 tablet (40 mg) by mouth once daily., Disp: 90 tablet, Rfl: 3    Tresiba FlexTouch U-100 100 unit/mL (3 mL) injection, Take as directed up to 30 units total daily, Disp: 30 mL, Rfl: 3    Current Facility-Administered Medications:     denosumab (Prolia) injection 60 mg, 60 mg, subcutaneous, q6 months, Jen Cano MD, 60 mg at 02/20/24 1004      Allergies as of 11/22/2024    (No Known " "Allergies)         Review of Systems   Cardiology: Lightheadedness-denies.  Chest pain-denies.  Leg edema-denies.  Palpitations-denies.  Respiratory: Cough-denies. Shortness of breath-denies.  Wheezing-denies.  Gastroenterology: Constipation-denies.  Diarrhea-denies.  Heartburn-denies.  Endocrinology: Cold intolerance-denies.  Heat intolerance-denies.  Sweats-denies.  Neurology: Headache-denies.  Tremor-denies.  Neuropathy in extremities-denies.  Psychology: Low energy-denies.  Irritability-denies.  Sleep disturbances-denies.      /70   Pulse 84   Ht 1.6 m (5' 3\")   Wt 58.5 kg (129 lb)   BMI 22.85 kg/m²       Labs:  Lab Results   Component Value Date    WBC 6.0 11/21/2024    NRBC 0.0 11/21/2024    RBC 3.96 (L) 11/21/2024    HGB 11.6 (L) 11/21/2024    HCT 36.1 11/21/2024     11/21/2024     Lab Results   Component Value Date    CALCIUM 8.7 11/21/2024    AST 11 11/21/2024    ALKPHOS 44 11/21/2024    BILITOT 0.4 11/21/2024    PROT 6.8 11/21/2024    ALBUMIN 4.2 11/21/2024    GLOB 2.4 11/06/2021    AGR 1.9 11/06/2021     (L) 11/21/2024    K 3.9 11/21/2024     11/21/2024    CO2 26 11/21/2024    ANIONGAP 11 11/21/2024    BUN 15 11/21/2024    CREATININE 0.63 11/21/2024    UREACREAUR 21.7 (H) 11/06/2021    GLUCOSE 81 11/21/2024    ALT 8 11/21/2024    EGFR >90 11/21/2024     Lab Results   Component Value Date    CHOL 150 11/21/2024    TRIG 50 11/21/2024    HDL 76.1 11/21/2024    LDLCALC 64 11/21/2024     Lab Results   Component Value Date    MICROALBCREA  10/24/2023      Comment:      One or more analytes used in this calculation is outside of the analytical measurement range. Calculation cannot be performed.     Lab Results   Component Value Date    TSH 2.45 10/24/2023     Lab Results   Component Value Date    NDTCVZAJ12 770 11/21/2024     Lab Results   Component Value Date    HGBA1C 6.3 11/22/2024         Physical Exam   General Appearance: pleasant, cooperative, no acute distress  HEENT: no " chemosis, no proptosis, no lid lag, no lid retraction  Neck: no lymphadenopathy, no thyromegaly, no dominant thyroid nodules  Heart: no murmurs, regular rate and rhythm, S1 and S2  Lungs: no wheezes, no rhonci, no rales  Extremities: no lower extremity swelling      Assessment/Plan   1. Type 2 diabetes mellitus with hyperglycemia, without long-term current use of insulin (Primary)  -A1c ordered and reviewed  -glycemic values reviewed  -labs reviewed    -having borderline lows overnight, lower tresiba from 10 to 6 units (target am sugars 100-130 range    -call if wt over 140lbs or under 120 lbs for ozempic adjustment    2. Mixed hyperlipidemia  -at target on statin, labs reviewed, no change in therapy     -bp historically at target, can get nervous when having it taken in office, will follow    Follow Up:  Valdemar 6 months    Medical Decision Making  Complexity of problem: Chronic illness of diabetes mellitus uncontrolled, progressing  Data analyzed and reviewed: Reviewed prior notes, blood glucose data, labs including HgbA1c, lipids, serum chemistries.  Ordered tests.   Risk of complications and morbidities: Is definite because of use of insulin and risk of hypoglycemia.  Prescription medications reviewed and modifications made.  Compliance assessed.  Addressed social determinants of health including food insecurity.

## 2024-11-22 ENCOUNTER — APPOINTMENT (OUTPATIENT)
Dept: ENDOCRINOLOGY | Facility: CLINIC | Age: 64
End: 2024-11-22
Payer: MEDICARE

## 2024-11-22 VITALS
BODY MASS INDEX: 22.86 KG/M2 | SYSTOLIC BLOOD PRESSURE: 142 MMHG | DIASTOLIC BLOOD PRESSURE: 70 MMHG | WEIGHT: 129 LBS | HEART RATE: 84 BPM | HEIGHT: 63 IN

## 2024-11-22 DIAGNOSIS — E78.2 MIXED HYPERLIPIDEMIA: ICD-10-CM

## 2024-11-22 DIAGNOSIS — E11.65 TYPE 2 DIABETES MELLITUS WITH HYPERGLYCEMIA, WITHOUT LONG-TERM CURRENT USE OF INSULIN: Primary | ICD-10-CM

## 2024-11-22 LAB — POC HEMOGLOBIN A1C: 6.3 % (ref 4.2–6.5)

## 2024-11-22 PROCEDURE — 1159F MED LIST DOCD IN RCRD: CPT | Performed by: INTERNAL MEDICINE

## 2024-11-22 PROCEDURE — 99214 OFFICE O/P EST MOD 30 MIN: CPT | Performed by: INTERNAL MEDICINE

## 2024-11-22 PROCEDURE — 3078F DIAST BP <80 MM HG: CPT | Performed by: INTERNAL MEDICINE

## 2024-11-22 PROCEDURE — 1036F TOBACCO NON-USER: CPT | Performed by: INTERNAL MEDICINE

## 2024-11-22 PROCEDURE — 95251 CONT GLUC MNTR ANALYSIS I&R: CPT | Performed by: INTERNAL MEDICINE

## 2024-11-22 PROCEDURE — 1123F ACP DISCUSS/DSCN MKR DOCD: CPT | Performed by: INTERNAL MEDICINE

## 2024-11-22 PROCEDURE — 3048F LDL-C <100 MG/DL: CPT | Performed by: INTERNAL MEDICINE

## 2024-11-22 PROCEDURE — 1126F AMNT PAIN NOTED NONE PRSNT: CPT | Performed by: INTERNAL MEDICINE

## 2024-11-22 PROCEDURE — 83036 HEMOGLOBIN GLYCOSYLATED A1C: CPT | Performed by: INTERNAL MEDICINE

## 2024-11-22 PROCEDURE — 3008F BODY MASS INDEX DOCD: CPT | Performed by: INTERNAL MEDICINE

## 2024-11-22 PROCEDURE — 3077F SYST BP >= 140 MM HG: CPT | Performed by: INTERNAL MEDICINE

## 2024-11-22 ASSESSMENT — PATIENT HEALTH QUESTIONNAIRE - PHQ9
1. LITTLE INTEREST OR PLEASURE IN DOING THINGS: NOT AT ALL
SUM OF ALL RESPONSES TO PHQ9 QUESTIONS 1 & 2: 0
2. FEELING DOWN, DEPRESSED OR HOPELESS: NOT AT ALL

## 2024-11-22 ASSESSMENT — LIFESTYLE VARIABLES
SKIP TO QUESTIONS 9-10: 1
HOW OFTEN DO YOU HAVE SIX OR MORE DRINKS ON ONE OCCASION: NEVER
AUDIT-C TOTAL SCORE: 0
HOW MANY STANDARD DRINKS CONTAINING ALCOHOL DO YOU HAVE ON A TYPICAL DAY: PATIENT DOES NOT DRINK
HOW OFTEN DO YOU HAVE A DRINK CONTAINING ALCOHOL: NEVER

## 2024-11-22 ASSESSMENT — ENCOUNTER SYMPTOMS
OCCASIONAL FEELINGS OF UNSTEADINESS: 0
LOSS OF SENSATION IN FEET: 0
DEPRESSION: 0

## 2024-11-22 ASSESSMENT — PAIN SCALES - GENERAL: PAINLEVEL_OUTOF10: 0-NO PAIN

## 2024-11-26 PROCEDURE — RXMED WILLOW AMBULATORY MEDICATION CHARGE

## 2024-11-27 ENCOUNTER — PHARMACY VISIT (OUTPATIENT)
Dept: PHARMACY | Facility: CLINIC | Age: 64
End: 2024-11-27
Payer: COMMERCIAL

## 2024-12-04 ENCOUNTER — TELEPHONE (OUTPATIENT)
Dept: ENDOCRINOLOGY | Facility: CLINIC | Age: 64
End: 2024-12-04
Payer: MEDICARE

## 2024-12-12 DIAGNOSIS — E11.65 TYPE 2 DIABETES MELLITUS WITH HYPERGLYCEMIA, WITHOUT LONG-TERM CURRENT USE OF INSULIN: ICD-10-CM

## 2024-12-12 PROCEDURE — RXMED WILLOW AMBULATORY MEDICATION CHARGE

## 2024-12-12 RX ORDER — SEMAGLUTIDE 0.68 MG/ML
0.5 INJECTION, SOLUTION SUBCUTANEOUS
Qty: 9 ML | Refills: 3 | Status: CANCELLED | OUTPATIENT
Start: 2024-12-15

## 2024-12-14 ENCOUNTER — PHARMACY VISIT (OUTPATIENT)
Dept: PHARMACY | Facility: CLINIC | Age: 64
End: 2024-12-14
Payer: COMMERCIAL

## 2025-01-05 PROCEDURE — RXMED WILLOW AMBULATORY MEDICATION CHARGE

## 2025-01-08 ENCOUNTER — PHARMACY VISIT (OUTPATIENT)
Dept: PHARMACY | Facility: CLINIC | Age: 65
End: 2025-01-08
Payer: COMMERCIAL

## 2025-02-03 PROCEDURE — RXMED WILLOW AMBULATORY MEDICATION CHARGE

## 2025-02-04 ENCOUNTER — PHARMACY VISIT (OUTPATIENT)
Dept: PHARMACY | Facility: CLINIC | Age: 65
End: 2025-02-04
Payer: COMMERCIAL

## 2025-02-12 ENCOUNTER — TELEPHONE (OUTPATIENT)
Dept: PRIMARY CARE | Facility: CLINIC | Age: 65
End: 2025-02-12
Payer: MEDICARE

## 2025-02-12 DIAGNOSIS — Z12.31 ENCOUNTER FOR SCREENING MAMMOGRAM FOR MALIGNANT NEOPLASM OF BREAST: Primary | ICD-10-CM

## 2025-02-12 NOTE — TELEPHONE ENCOUNTER
Patient requesting order for annual screening mammogram    Order pended for review and signature.

## 2025-02-13 NOTE — TELEPHONE ENCOUNTER
Spoke to patient,per patient they scheduled it for her as a self referral but insurance will only cover if referral is from a physician

## 2025-02-24 PROCEDURE — RXMED WILLOW AMBULATORY MEDICATION CHARGE

## 2025-02-26 ENCOUNTER — PHARMACY VISIT (OUTPATIENT)
Dept: PHARMACY | Facility: CLINIC | Age: 65
End: 2025-02-26
Payer: COMMERCIAL

## 2025-03-11 ENCOUNTER — APPOINTMENT (OUTPATIENT)
Dept: RADIOLOGY | Facility: CLINIC | Age: 65
End: 2025-03-11
Payer: MEDICARE

## 2025-03-11 ENCOUNTER — HOSPITAL ENCOUNTER (OUTPATIENT)
Dept: RADIOLOGY | Facility: CLINIC | Age: 65
Discharge: HOME | End: 2025-03-11
Payer: MEDICARE

## 2025-03-11 DIAGNOSIS — Z12.31 ENCOUNTER FOR SCREENING MAMMOGRAM FOR MALIGNANT NEOPLASM OF BREAST: ICD-10-CM

## 2025-03-11 PROCEDURE — 77067 SCR MAMMO BI INCL CAD: CPT | Performed by: RADIOLOGY

## 2025-03-11 PROCEDURE — 77067 SCR MAMMO BI INCL CAD: CPT

## 2025-03-11 PROCEDURE — 77063 BREAST TOMOSYNTHESIS BI: CPT | Performed by: RADIOLOGY

## 2025-03-17 ENCOUNTER — PHARMACY VISIT (OUTPATIENT)
Dept: PHARMACY | Facility: CLINIC | Age: 65
End: 2025-03-17
Payer: COMMERCIAL

## 2025-03-17 ENCOUNTER — OFFICE VISIT (OUTPATIENT)
Dept: URGENT CARE | Age: 65
End: 2025-03-17
Payer: MEDICARE

## 2025-03-17 VITALS
DIASTOLIC BLOOD PRESSURE: 89 MMHG | HEIGHT: 63 IN | SYSTOLIC BLOOD PRESSURE: 165 MMHG | OXYGEN SATURATION: 99 % | RESPIRATION RATE: 18 BRPM | WEIGHT: 133.6 LBS | BODY MASS INDEX: 23.67 KG/M2 | TEMPERATURE: 97.5 F | HEART RATE: 83 BPM

## 2025-03-17 DIAGNOSIS — R30.0 BURNING WITH URINATION: ICD-10-CM

## 2025-03-17 DIAGNOSIS — N30.01 ACUTE CYSTITIS WITH HEMATURIA: Primary | ICD-10-CM

## 2025-03-17 LAB
POC APPEARANCE, URINE: ABNORMAL
POC BILIRUBIN, URINE: NEGATIVE
POC BLOOD, URINE: ABNORMAL
POC COLOR, URINE: ABNORMAL
POC GLUCOSE, URINE: NEGATIVE MG/DL
POC KETONES, URINE: NEGATIVE MG/DL
POC LEUKOCYTES, URINE: ABNORMAL
POC NITRITE,URINE: NEGATIVE
POC PH, URINE: 6 PH
POC PROTEIN, URINE: ABNORMAL MG/DL
POC SPECIFIC GRAVITY, URINE: 1.02
POC UROBILINOGEN, URINE: 0.2 EU/DL

## 2025-03-17 PROCEDURE — 1160F RVW MEDS BY RX/DR IN RCRD: CPT

## 2025-03-17 PROCEDURE — 1123F ACP DISCUSS/DSCN MKR DOCD: CPT

## 2025-03-17 PROCEDURE — 1159F MED LIST DOCD IN RCRD: CPT

## 2025-03-17 PROCEDURE — 3077F SYST BP >= 140 MM HG: CPT

## 2025-03-17 PROCEDURE — 81003 URINALYSIS AUTO W/O SCOPE: CPT

## 2025-03-17 PROCEDURE — 3008F BODY MASS INDEX DOCD: CPT

## 2025-03-17 PROCEDURE — 3079F DIAST BP 80-89 MM HG: CPT

## 2025-03-17 PROCEDURE — 99204 OFFICE O/P NEW MOD 45 MIN: CPT

## 2025-03-17 PROCEDURE — 1036F TOBACCO NON-USER: CPT

## 2025-03-17 PROCEDURE — RXMED WILLOW AMBULATORY MEDICATION CHARGE

## 2025-03-17 RX ORDER — NITROFURANTOIN 25; 75 MG/1; MG/1
100 CAPSULE ORAL 2 TIMES DAILY
Qty: 14 CAPSULE | Refills: 0 | Status: SHIPPED | OUTPATIENT
Start: 2025-03-17 | End: 2025-03-24

## 2025-03-17 ASSESSMENT — ENCOUNTER SYMPTOMS
BACK PAIN: 0
CHILLS: 0
FREQUENCY: 1
FEVER: 0
FLANK PAIN: 0
VOMITING: 0
NAUSEA: 0
SHORTNESS OF BREATH: 0
CHEST TIGHTNESS: 0
HEMATURIA: 1
ABDOMINAL PAIN: 0
COUGH: 0
WHEEZING: 0
ABDOMINAL DISTENTION: 0
WEAKNESS: 0
DIARRHEA: 0
SORE THROAT: 0
FATIGUE: 0
CONFUSION: 0
DYSURIA: 1
DIZZINESS: 0

## 2025-03-17 ASSESSMENT — PATIENT HEALTH QUESTIONNAIRE - PHQ9
SUM OF ALL RESPONSES TO PHQ9 QUESTIONS 1 & 2: 0
2. FEELING DOWN, DEPRESSED OR HOPELESS: NOT AT ALL
1. LITTLE INTEREST OR PLEASURE IN DOING THINGS: NOT AT ALL

## 2025-03-17 NOTE — PATIENT INSTRUCTIONS
Discharge instructions:    Please follow up with your Primary Care Physician within the next 5-7 days.    Pending urine culture in 4 to 5 days.  Will change antibiotic as necessary.    If you develop any fever, chills, nausea, vomiting, abdominal pain, back pain, worsening symptoms please immediately go to the emergency room for reevaluation.    It is important to take prescriptions as prescribed and complete all antibiotics.     If your symptoms worsen you are instructed to immediately go to the emergency room for reevaluation and further assessment.    If you develop any chest pain, SOB, or difficulty breathing you are instructed to go to the emergency room for reevaluation.    All discharge instructions will be provided and explained to the patient at discharge.    If you have any questions regarding your treatment plan please call the [facility] urgent care clinic.

## 2025-03-17 NOTE — PROGRESS NOTES
Subjective   Patient ID: Amaya Quiroz is a 65 y.o. female. They present today with a chief complaint of burning with urination (Pressure and burning x 2 days) and Blood in Urine (Pt states blood when she wiped this morning.).    History of Present Illness  Patient is a 65-year-old female presenting to the clinic with complaints of burning during urination and hematuria.  Patient states she noticed some mild burning yesterday into today.  Patient noticed blood with wiping this morning.  Patient is concerned about urinary tract infection.  Patient states she does have intermittent pressure over the bladder.  This occurs during urination.  No real pressure at rest.  No abdominal pain.  No back pain.  No nausea.  No vomiting.  No fevers.  No chills.  No current clinical symptoms or signs of pyelonephritis.  Patient also with increased frequency and urgency.      History provided by:  Patient  Blood in Urine  Irritative symptoms include frequency and urgency. Associated symptoms include dysuria. Pertinent negatives include no abdominal pain, chills, fever, flank pain, nausea or vomiting.       Past Medical History  Allergies as of 03/17/2025    (No Known Allergies)       (Not in a hospital admission)       Past Medical History:   Diagnosis Date    Bilateral cataracts 09/14/2023    Carpal tunnel syndrome 01/05/2017    History of COVID-19 09/2022    Mixed hyperlipidemia     [facility]    Senile osteoporosis 07/27/2024    Type 2 diabetes mellitus with hyperglycemia, without long-term current use of insulin     [facility]       Past Surgical History:   Procedure Laterality Date    CARPAL TUNNEL RELEASE Bilateral 2017    CATARACT EXTRACTION, BILATERAL  05/01/2022        reports that she has never smoked. She has never been exposed to tobacco smoke. She has never used smokeless tobacco. She reports that she does not currently use alcohol. She reports that she does not currently use drugs.    Review of Systems  Review of Systems  "  Constitutional:  Negative for chills, fatigue and fever.   HENT:  Negative for sore throat.    Respiratory:  Negative for cough, chest tightness, shortness of breath and wheezing.    Cardiovascular:  Negative for chest pain.   Gastrointestinal:  Negative for abdominal distention, abdominal pain, diarrhea, nausea and vomiting.   Genitourinary:  Positive for dysuria, frequency, hematuria and urgency. Negative for decreased urine volume and flank pain.   Musculoskeletal:  Negative for back pain.   Skin:  Negative for rash.   Neurological:  Negative for dizziness and weakness.   Psychiatric/Behavioral:  Negative for confusion.                                   Objective    Vitals:    03/17/25 0922   BP: 165/89   Pulse: 83   Resp: 18   Temp: 36.4 °C (97.5 °F)   TempSrc: Oral   SpO2: 99%   Weight: 60.6 kg (133 lb 9.6 oz)   Height: 1.6 m (5' 3\")     No LMP recorded (approximate). Patient is postmenopausal.    Physical Exam  Vitals reviewed.   Constitutional:       General: She is awake.      Appearance: Normal appearance. She is well-developed and well-groomed. She is not ill-appearing, toxic-appearing or diaphoretic.   HENT:      Head: Normocephalic and atraumatic.   Cardiovascular:      Rate and Rhythm: Normal rate and regular rhythm.      Heart sounds: Normal heart sounds, S1 normal and S2 normal. Heart sounds not distant. No murmur heard.     No friction rub. No gallop.   Pulmonary:      Effort: Pulmonary effort is normal. No accessory muscle usage, prolonged expiration or respiratory distress.      Breath sounds: Normal breath sounds and air entry. No stridor or decreased air movement. No decreased breath sounds, wheezing, rhonchi or rales.   Abdominal:      General: Abdomen is flat. There is no distension.      Palpations: Abdomen is soft.      Tenderness: There is no abdominal tenderness. There is no right CVA tenderness or left CVA tenderness.   Skin:     General: Skin is warm.   Neurological:      General: No " focal deficit present.      Mental Status: She is alert and oriented to person, place, and time.   Psychiatric:         Behavior: Behavior is cooperative.         Procedures    Point of Care Test & Imaging Results from this visit  Results for orders placed or performed in visit on 03/17/25   POCT UA Automated manually resulted   Result Value Ref Range    POC Color, Urine Celia (A) Straw, Yellow, Light-Yellow    POC Appearance, Urine Hazy (A) Clear    POC Glucose, Urine NEGATIVE NEGATIVE mg/dl    POC Bilirubin, Urine NEGATIVE NEGATIVE    POC Ketones, Urine NEGATIVE NEGATIVE mg/dl    POC Specific Gravity, Urine 1.025 1.005 - 1.035    POC Blood, Urine LARGE (3+) (A) NEGATIVE    POC PH, Urine 6.0 No Reference Range Established PH    POC Protein, Urine 30 (1+) (A) NEGATIVE mg/dl    POC Urobilinogen, Urine 0.2 0.2, 1.0 EU/DL    Poc Nitrite, Urine NEGATIVE NEGATIVE    POC Leukocytes, Urine SMALL (1+) (A) NEGATIVE      No results found.    Diagnostic study results (if any) were reviewed by [facility].    Assessment/Plan   Allergies, medications, history, and pertinent labs/EKGs/Imaging reviewed by Terrence Palafox PA-C.     Medical Decision Making:    Patient is a 65-year-old female presenting to the clinic with complaints of burning during urination and hematuria.  Patient states she noticed some mild burning yesterday into today.  Patient noticed blood with wiping this morning.  Patient is concerned about urinary tract infection.  Patient states she does have intermittent pressure over the bladder.  This occurs during urination.  No real pressure at rest.  No abdominal pain.  No back pain.  No nausea.  No vomiting.  No fevers.  No chills.  No current clinical symptoms or signs of pyelonephritis.  Patient also with frequency, urgency.  Vital signs in the clinic are stable and within no limits.  Physical examination is benign as above.  No CVA tenderness.  No abdominal tenderness.  Patient's urine in the clinic  with 3+ blood.  Leukocytes.  +1 protein.  Patient does have a history of diabetes.  Could be spillage of protein secondary to diabetes or could be falsely elevated secondary to blood.  Patient's physical exam findings and symptoms are clinically significant for acute cystitis.  Low risk for pyelonephritis given no CVA tenderness.  No vomiting.  No fevers.  No chills.  No fatigue.  No clinical symptoms of pyelonephritis.  Unlikely kidney stone at this time given no CVA tenderness or abdominal pain.  Patient will be treated with Macrobid for 7 days.  Discharge instructions to follow.  Patient does not want pain control this time.  Urine culture in 4 to 5 days. Discharge instructions: Please follow up with your Primary Care Physician within the next 5-7 days. Pending urine culture in 4 to 5 days.  Will change antibiotic as necessary. If you develop any fever, chills, nausea, vomiting, abdominal pain, back pain, worsening symptoms please immediately go to the emergency room for reevaluation. It is important to take prescriptions as prescribed and complete all antibiotics. If your symptoms worsen you are instructed to immediately go to the emergency room for reevaluation and further assessment. If you develop any chest pain, SOB, or difficulty breathing you are instructed to go to the emergency room for reevaluation. All discharge instructions will be provided and explained to the patient at discharge. If you have any questions regarding your treatment plan please call the [facility] urgent care clinic. [facility]    Orders and Diagnoses  Diagnoses and all orders for this visit:  Burning with urination  -     POCT UA Automated manually resulted      Medical Admin Record      Patient disposition: Home    Electronically signed by [facility]  9:59 AM

## 2025-03-20 LAB — BACTERIA UR CULT: ABNORMAL

## 2025-03-25 ENCOUNTER — TELEPHONE (OUTPATIENT)
Dept: INFUSION THERAPY | Facility: CLINIC | Age: 65
End: 2025-03-25
Payer: MEDICARE

## 2025-03-27 ENCOUNTER — LAB (OUTPATIENT)
Dept: LAB | Facility: HOSPITAL | Age: 65
End: 2025-03-27
Payer: MEDICARE

## 2025-03-27 DIAGNOSIS — M81.0 SENILE OSTEOPOROSIS: ICD-10-CM

## 2025-03-27 LAB
ALBUMIN SERPL BCP-MCNC: 4.2 G/DL (ref 3.4–5)
ALP SERPL-CCNC: 42 U/L (ref 33–136)
ALT SERPL W P-5'-P-CCNC: 8 U/L (ref 7–45)
ANION GAP SERPL CALC-SCNC: 13 MMOL/L (ref 10–20)
AST SERPL W P-5'-P-CCNC: 10 U/L (ref 9–39)
BILIRUB SERPL-MCNC: 0.5 MG/DL (ref 0–1.2)
BUN SERPL-MCNC: 13 MG/DL (ref 6–23)
CALCIUM SERPL-MCNC: 9 MG/DL (ref 8.6–10.3)
CHLORIDE SERPL-SCNC: 102 MMOL/L (ref 98–107)
CO2 SERPL-SCNC: 25 MMOL/L (ref 21–32)
CREAT SERPL-MCNC: 0.64 MG/DL (ref 0.5–1.05)
EGFRCR SERPLBLD CKD-EPI 2021: >90 ML/MIN/1.73M*2
GLUCOSE SERPL-MCNC: 127 MG/DL (ref 74–99)
POTASSIUM SERPL-SCNC: 4.3 MMOL/L (ref 3.5–5.3)
PROT SERPL-MCNC: 6.1 G/DL (ref 6.4–8.2)
SODIUM SERPL-SCNC: 136 MMOL/L (ref 136–145)

## 2025-03-27 PROCEDURE — 80053 COMPREHEN METABOLIC PANEL: CPT

## 2025-04-01 ENCOUNTER — APPOINTMENT (OUTPATIENT)
Dept: INFUSION THERAPY | Facility: CLINIC | Age: 65
End: 2025-04-01
Payer: MEDICARE

## 2025-04-01 VITALS
DIASTOLIC BLOOD PRESSURE: 74 MMHG | BODY MASS INDEX: 23.9 KG/M2 | SYSTOLIC BLOOD PRESSURE: 150 MMHG | OXYGEN SATURATION: 98 % | RESPIRATION RATE: 16 BRPM | HEART RATE: 81 BPM | TEMPERATURE: 97.6 F | WEIGHT: 134.9 LBS

## 2025-04-01 DIAGNOSIS — M81.0 SENILE OSTEOPOROSIS: Primary | ICD-10-CM

## 2025-04-01 PROCEDURE — 96372 THER/PROPH/DIAG INJ SC/IM: CPT | Performed by: REGISTERED NURSE

## 2025-04-01 RX ORDER — FAMOTIDINE 10 MG/ML
20 INJECTION, SOLUTION INTRAVENOUS ONCE AS NEEDED
OUTPATIENT
Start: 2025-05-20

## 2025-04-01 RX ORDER — DIPHENHYDRAMINE HYDROCHLORIDE 50 MG/ML
50 INJECTION, SOLUTION INTRAMUSCULAR; INTRAVENOUS AS NEEDED
OUTPATIENT
Start: 2025-05-20

## 2025-04-01 RX ORDER — ACETAMINOPHEN 325 MG/1
650 TABLET ORAL ONCE
Status: DISCONTINUED | OUTPATIENT
Start: 2025-04-01 | End: 2025-04-01 | Stop reason: HOSPADM

## 2025-04-01 RX ORDER — EPINEPHRINE 0.3 MG/.3ML
0.3 INJECTION SUBCUTANEOUS EVERY 5 MIN PRN
OUTPATIENT
Start: 2025-05-20

## 2025-04-01 RX ORDER — ALBUTEROL SULFATE 0.83 MG/ML
3 SOLUTION RESPIRATORY (INHALATION) AS NEEDED
OUTPATIENT
Start: 2025-05-20

## 2025-04-01 RX ORDER — ACETAMINOPHEN 325 MG/1
650 TABLET ORAL ONCE
OUTPATIENT
Start: 2025-05-20

## 2025-04-01 ASSESSMENT — ENCOUNTER SYMPTOMS
NUMBNESS: 0
LIGHT-HEADEDNESS: 0
SHORTNESS OF BREATH: 0
LEG SWELLING: 0
EXTREMITY WEAKNESS: 0
WOUND: 0
COUGH: 0
PALPITATIONS: 0
WHEEZING: 0
DIZZINESS: 0

## 2025-04-01 NOTE — PROGRESS NOTES
[facility]   Infusion Clinic Note   Date: 2025   Name: Amaya Quiroz  : 1960   MRN: [id number]         Reason for Visit: Follow-up and Injections (6 month Prolia 60mg subcutaneous/injection)         Today: We administered denosumab.       Ordered By: Jen Cano MD       For a Diagnosis of: Senile osteoporosis       At today's visit patient accompanied by: Self      Today's Vitals:   Vitals:    25 0754   BP: 150/74   Pulse: 81   Resp: 16   Temp: 36.4 °C (97.6 °F)   TempSrc: Temporal   SpO2: 98%   Weight: 61.2 kg (134 lb 14.4 oz)             Pre - Treatment Checklist:      - Previous reaction to current treatment: no      (Assess patient for the concerns below. Document provider notification as appropriate).  - Active or recent infection with/without current antibiotic use: no  - Recent or planned invasive dental work: no  - Recent or planned surgeries: no  - Recently received or plans to receive vaccinations: no  - Has treatment related toxicities: no  - Any chance may be pregnant:  no      Pain: 0   - Is the pain different from normal: no   - Is prescribing Doctor aware:  n/a      Labs: Reviewed       Fall Risk Screening: Michel Fall Risk  History of Falling, Immediate or Within 3 Months: No  Secondary Diagnosis: No  Ambulatory Aid: Walks without aid/bedrest/nurse assist  Intravenous Therapy/Heparin Lock: No  Gait/Transferring: Normal/bedrest/immobile  Mental Status: Oriented to own ability  Michel Fall Risk Score: 0       Review Of Systems:  Review of Systems   Respiratory:  Negative for cough, shortness of breath and wheezing.    Cardiovascular:  Negative for chest pain, leg swelling and palpitations.   Skin:  Negative for itching, rash and wound.   Neurological:  Negative for dizziness, extremity weakness, light-headedness and numbness.         Infusion Readiness:  - Assessment Concerns Related to Infusion: No  - Provider notified: no      New Patient  Education:    N/A (returning patient for continuation of therapy. Ongoing education provided as needed.)        Treatment Conditions & Drug Specific Questions:    Denosumab  (PROLIA. XGEVA)    (Unless otherwise specified on patient specific therapy plan):     TREATMENT CONDITIONS:  Unless otherwise specified on patient specific therapy plan HOLD and notify provider prior to proceeding with today's injection if patients:  O Corrected or Serum Calcium LESS THAN 8.6 mg/dL  OR Ionized calcium less than 1.1 mmol/L or  less than 4.7 mg/dL (depending on resulting agency)  o Recent or planned invasive dental procedure (within 4 weeks)    Lab Results   Component Value Date    CALCIUM 9.0 03/27/2025      Lab Results   Component Value Date    CAION 1.17 11/21/2024       Patient meets treatment conditions? Yes    DRUG SPECIFIC QUESTIONS:  Is the patient taking calcium and vitamin D? Yes - general vitamin   (Recommended)    Pt Instructed on following risks: (1) hypocalcemia, (2) osteonecrosis of the jaw, (3) atypical femoral fractures, (4) serious infections, and (5) dermatologic reactions?  Yes      REMINDER:  PREGNANCY CATEGORY X DRUG. OBTAIN NEGTATIVE PREGNANCY TEST PRIOR TO FIRST INFUSION FOR WOMEN OF CHILDBEARING ABILITY   REMS DRUG    Recommended Vitals/Observation:  Vitals: Obtain vitals prior to injection.  Observation: Patient may leave immediately following injection.        Weight Based Drug Calculations:    WEIGHT BASED DRUGS: NOT APPLICABLE / FLAT DOSE       Post Treatment: Patient tolerated treatment without issue and was discharged in no apparent distress.      Note Authored / Patient Cared for By: Mami Krishna LPN

## 2025-04-01 NOTE — PATIENT INSTRUCTIONS
Today :We administered denosumab. 6 month Prolia 60mg subcutaneous injection in the right upper arm.  Blood calcium to be drawn within 28 days of next Prolia appointment.    For:   1. Senile osteoporosis       Your next appointment is due in:  6 months        Please read the  Medication Guide that was given to you and reviewed during todays visit.     (Tell all doctors including dentists that you are taking this medication)     Go to the emergency room or call 911 if:  -You have signs of allergic reaction:   -Rash, hives, itching.   -Swollen, blistered, peeling skin.   -Swelling of face, lips, mouth, tongue or throat.   -Tightness of chest, trouble breathing, swallowing or talking     Call your doctor:  - If injection site gets red, warm, swollen, itchy or leaks fluid or pus.     (Leave band aid on your IV site for at least 2 hours and keep area clean and dry  - If you get sick or have symptoms of infection or are not feeling well for any reason.    (Wash your hands often, stay away from people who are sick)  - If you have side effects from your medication that do not go away or are bothersome.     (Refer to the teaching your nurse gave you for side effects to call your doctor about)    - Common side effects may include:  stuffy nose, headache, feeling tired, muscle aches, upset stomach  - Before receiving any vaccines     - Call the [facility] at   If:  - You get sick, are on antibiotics, have had a recent vaccine, have surgery or dental work and your doctor wants your visit rescheduled.  - You need to cancel and reschedule your visit for any reason. Call at least 2 days before your visit if you need to cancel.   - Your insurance changes before your next visit.    (We will need to get approval from your new insurance. This can take up to two weeks.)     The [facility] is opened Monday thru Friday. We are closed on weekends and holidays.   Voice mail will take your call if the  center is closed. If you leave a message please allow 24 hours for a call back during weekdays. If you leave a message on a weekend/holiday, we will call you back the next business day.    A pharmacist is available Monday - Friday from 8:30AM to 3:30PM to help answer any questions you may have about your prescriptions(s). Please call pharmacy at:    [facility]: [phone]  [facility]: [phone]  [facility]: [phone]

## 2025-04-16 ENCOUNTER — TELEMEDICINE (OUTPATIENT)
Dept: PHARMACY | Facility: HOSPITAL | Age: 65
End: 2025-04-16
Payer: MEDICARE

## 2025-04-16 DIAGNOSIS — Z79.4 TYPE 2 DIABETES MELLITUS WITH HYPERGLYCEMIA, WITH LONG-TERM CURRENT USE OF INSULIN: ICD-10-CM

## 2025-04-16 DIAGNOSIS — E11.65 TYPE 2 DIABETES MELLITUS WITH HYPERGLYCEMIA, WITH LONG-TERM CURRENT USE OF INSULIN: Primary | ICD-10-CM

## 2025-04-16 DIAGNOSIS — E11.65 TYPE 2 DIABETES MELLITUS WITH HYPERGLYCEMIA, WITH LONG-TERM CURRENT USE OF INSULIN: ICD-10-CM

## 2025-04-16 DIAGNOSIS — Z79.4 TYPE 2 DIABETES MELLITUS WITH HYPERGLYCEMIA, WITH LONG-TERM CURRENT USE OF INSULIN: Primary | ICD-10-CM

## 2025-04-17 PROBLEM — Z79.4 TYPE 2 DIABETES MELLITUS WITH HYPERGLYCEMIA, WITH LONG-TERM CURRENT USE OF INSULIN: Status: ACTIVE | Noted: 2023-09-14

## 2025-04-17 RX ORDER — INSULIN DEGLUDEC 100 U/ML
INJECTION, SOLUTION SUBCUTANEOUS
Qty: 30 ML | Refills: 3 | Status: SHIPPED | OUTPATIENT
Start: 2025-04-17

## 2025-04-17 RX ORDER — SEMAGLUTIDE 0.68 MG/ML
0.5 INJECTION, SOLUTION SUBCUTANEOUS
Qty: 9 ML | Refills: 3 | Status: SHIPPED | OUTPATIENT
Start: 2025-04-17

## 2025-04-17 NOTE — PROGRESS NOTES
[facility]      Amaya Quiroz a 65 y.o. patient was referred to the Clinical Pharmacy Team for diabetes management. She presents today via telephone for follow up assessment and management, and  PAP renewal.      Referred by Dr. Wil Aldrich       Diabetes:   A1c-6.3%.   Pt is testing sugars 4 times per day with torito 3. Pt is having low sugars 0 times/week. 14 days torito 3 data: 72% in range, 4% low, pattern: low 100's overnight into waking (at times <100), after breakfast mid 100's and through the day, around 100 at bedtime.  Pt is following a carb controlled diet and knows reasonable carb allowances. Pt is able to afford their medications. Pt is exercising walking 3 miles/day.           Pt taking metformin,  ozempic 0.25 (decreased from 0.5 and 1mg as too much wt loss), 10 units tresiba every day   -jardiance came off  due to repeat mycotic infections.  -call office if wt goes <120 and we will lower/stop ozempic and call if over 140 lbs      Primary Prevention:   Taking pravastatin 40mg for primary prevention.    Current Medications[1]   Medical History[2]   RX Allergies[3]     Lab Results   Component Value Date    HGBA1C 6.3 11/22/2024       Lab Results   Component Value Date    BILITOT 0.5 03/27/2025    CALCIUM 9.0 03/27/2025    CO2 25 03/27/2025     03/27/2025    CREATININE 0.64 03/27/2025    GLUCOSE 127 (H) 03/27/2025    ALKPHOS 42 03/27/2025    K 4.3 03/27/2025    PROT 6.1 (L) 03/27/2025     03/27/2025    AST 10 03/27/2025    ALT 8 03/27/2025    BUN 13 03/27/2025    ANIONGAP 13 03/27/2025    MG 1.76 08/01/2024    ALBUMIN 4.2 03/27/2025    EGFR >90 03/27/2025       Lab Results   Component Value Date    WBC 6.0 11/21/2024    RBC 3.96 (L) 11/21/2024    HGB 11.6 (L) 11/21/2024    HCT 36.1 11/21/2024    MCV 91 11/21/2024    MCH 29.3 11/21/2024    MCHC 32.1 11/21/2024    RDW 12.4 11/21/2024     11/21/2024       Lab Results   Component Value Date    CHOL 150 11/21/2024     TRIG 50 11/21/2024    HDL 76.1 11/21/2024    LDLCALC 64 11/21/2024       Lab Results   Component Value Date    TSH 2.45 10/24/2023       Lab Results   Component Value Date    CREATU 50.9 10/24/2023    MICROALBCREA  10/24/2023      Comment:      One or more analytes used in this calculation is outside of the analytical measurement range. Calculation cannot be performed.        Patient Assistance Screening (VAF)  Patient verbally reports monthly or yearly income which is less than 400% federal poverty level.  Renewal application for program has been submitted for the following medications: Ozempic and Tresiba  Patient has been informed that program team will be reaching out to them to discuss necessary documentation, instructed to answer phone/return voicemail.   Patient aware this process may take up to 6 weeks.   If approved medication must be filled through [facility] pharmacy and may be picked up or mailed to patient.    PATIENT EDUCATION/DISCUSSION:  - Counseled patient on MOA, expectations, side effects, duration of therapy, contraindications, administration, and monitoring parameters  - Answered all patient questions and concerns    Assessment/Plan   1. Type 2 diabetes mellitus with hyperglycemia, with long-term current use of insulin  - Referral to Clinical Pharmacy    Type 2 diabetes - well controlled on current regimen.   -Continue Ozempic 0.25mg weekly for glycemic control and cardiac benefits - unable to increase dose d/t weight.   -Continue Tresiba 10 units daily for glycemic control.     Continue Ozempic 0.25mg weekly.   Continue Tresiba 10 units daily.   Re-apply for  PAP for cost assistance.   Prescription(s) sent to [facility] pharmacy for assistance on authorization and copay. Medication will be mailed to patient.  Continue all meds under the continuation of care with the referring provider and clinical pharmacy team.    FOLLOW UP:  As scheduled with Dr. Aldrich and team in the endo office.  "  Annually with clinical pharmacist for re-enrollment in [facility], or as needed for medication / dose adjustments.      Sonali Mendez, PharmD      Verbal consent to manage patient's drug therapy was obtained from the patient and/or an individual authorized to act on behalf of a patient. They were informed they may decline to participate or withdraw from participation in pharmacy services at any time.               [1]   Current Outpatient Medications:     metFORMIN  mg 24 hr tablet, Take 2 tablets (1,000 mg) by mouth 2 times a day., Disp: 360 tablet, Rfl: 3    ONETOUCH DELICA LANCETS MISC, once daily. Test as directed, Disp: , Rfl:     Ozempic 0.25 mg or 0.5 mg (2 mg/3 mL) pen injector, Inject 0.5 mg under the skin 1 (one) time per week., Disp: 9 mL, Rfl: 3    pen needle, diabetic (BD Ultra-Fine Maggi Pen Needle) 32 gauge x 5/32\" needle, USE AS DIRECTED DAILY, Disp: 100 each, Rfl: 3    pravastatin (Pravachol) 40 mg tablet, Take 1 tablet (40 mg) by mouth once daily., Disp: 90 tablet, Rfl: 3    Tresiba FlexTouch U-100 100 unit/mL (3 mL) injection, Take as directed up to 30 units total daily, Disp: 30 mL, Rfl: 3  [2]   Past Medical History:  Diagnosis Date    Bilateral cataracts 09/14/2023    Carpal tunnel syndrome 01/05/2017    History of COVID-19 09/2022    Mixed hyperlipidemia     [facility]    Senile osteoporosis 07/27/2024    Type 2 diabetes mellitus with hyperglycemia, without long-term current use of insulin     [facility]   [3] No Known Allergies    "

## 2025-04-23 ENCOUNTER — TELEPHONE (OUTPATIENT)
Dept: PRIMARY CARE | Facility: CLINIC | Age: 65
End: 2025-04-23
Payer: MEDICARE

## 2025-04-23 DIAGNOSIS — Z12.12 SCREENING FOR COLORECTAL CANCER: Primary | ICD-10-CM

## 2025-04-23 DIAGNOSIS — Z12.11 SCREENING FOR COLORECTAL CANCER: Primary | ICD-10-CM

## 2025-04-23 PROCEDURE — RXMED WILLOW AMBULATORY MEDICATION CHARGE

## 2025-04-23 NOTE — TELEPHONE ENCOUNTER
Received letter from Rae.  It has been 3 years since last done.  Does she need to still do it?  If so can you order it for her and have it sent to her house.  Please have nurse contact with response

## 2025-04-24 ENCOUNTER — PHARMACY VISIT (OUTPATIENT)
Dept: PHARMACY | Facility: CLINIC | Age: 65
End: 2025-04-24
Payer: COMMERCIAL

## 2025-04-29 DIAGNOSIS — E78.2 MIXED HYPERLIPIDEMIA: ICD-10-CM

## 2025-04-29 PROCEDURE — RXMED WILLOW AMBULATORY MEDICATION CHARGE

## 2025-04-29 RX ORDER — PRAVASTATIN SODIUM 40 MG/1
40 TABLET ORAL DAILY
Qty: 90 TABLET | Refills: 3 | Status: SHIPPED | OUTPATIENT
Start: 2025-04-29

## 2025-05-02 ENCOUNTER — PHARMACY VISIT (OUTPATIENT)
Dept: PHARMACY | Facility: CLINIC | Age: 65
End: 2025-05-02
Payer: COMMERCIAL

## 2025-05-04 LAB — NONINV COLON CA DNA+OCC BLD SCRN STL QL: NEGATIVE

## 2025-05-05 PROCEDURE — RXMED WILLOW AMBULATORY MEDICATION CHARGE

## 2025-05-06 ENCOUNTER — PHARMACY VISIT (OUTPATIENT)
Dept: PHARMACY | Facility: CLINIC | Age: 65
End: 2025-05-06
Payer: COMMERCIAL

## 2025-05-20 DIAGNOSIS — M81.0 SENILE OSTEOPOROSIS: ICD-10-CM

## 2025-05-26 NOTE — PROGRESS NOTES
"HPI   66 yo with  Diabetes 2 (workup for late onset type 1 neg), Dyslipidemia, highest wt 200 lbs, osteoporosis (with pcp) presents for followup. A1c-6.3% last visit, today 7% (tony data suggests 6%).      Pt is testing sugars 4 times per day with tony 3. Pt is having low sugars 0 times/week. Pt is following a carb controlled diet and knows reasonable carb allowances. Pt is able to afford their medications. Pt is exercising walking 3 miles/day.           Pt taking metformin,  ozempic 0.25 (decreased from 0.5 and 1mg as too much wt loss), 6 (was 10 )units tresiba every day   -jardiance came off  due to repeat mycotic infections.  -call office if wt goes <120 and we will lower/stop ozempic and call if over 140 lbs     30 days tony 3 data: 89% in range, 4% low, pattern: low 100's overnight into waking, after breakfast mid 100's and through the day, around 100 at bedtime.           Taking pravastatin 40mg for primary prevention.       Current Outpatient Medications:     blood-glucose sensor (FreeStyle Tony 3 Plus Sensor) device, , Disp: , Rfl:     calcium carbonate/vitamin D3 (CALCIUM 600 + D,3, ORAL), Take by mouth., Disp: , Rfl:     metFORMIN  mg 24 hr tablet, Take 2 tablets (1,000 mg) by mouth 2 times a day., Disp: 360 tablet, Rfl: 3    ONETOUCH DELICA LANCETS MISC, once daily. Test as directed, Disp: , Rfl:     Ozempic 0.25 mg or 0.5 mg (2 mg/3 mL) pen injector, Inject 0.5 mg under the skin every 7 days., Disp: 9 mL, Rfl: 3    pen needle, diabetic (BD Ultra-Fine Maggi Pen Needle) 32 gauge x 5/32\" needle, USE AS DIRECTED DAILY, Disp: 100 each, Rfl: 3    pravastatin (Pravachol) 40 mg tablet, Take 1 tablet (40 mg) by mouth once daily., Disp: 90 tablet, Rfl: 3    Tresiba FlexTouch U-100 100 unit/mL (3 mL) pen, Take as directed up to 30 units total daily, Disp: 30 mL, Rfl: 3      Allergies as of 05/27/2025    (No Known Allergies)         Review of Systems   Cardiology: Lightheadedness-denies.  Chest pain-denies. " " Leg edema-denies.  Palpitations-denies.  Respiratory: Cough-denies. Shortness of breath-denies.  Wheezing-denies.  Gastroenterology: Constipation-denies.  Diarrhea-denies.  Heartburn-denies.  Endocrinology: Cold intolerance-denies.  Heat intolerance-denies.  Sweats-denies.  Neurology: Headache-denies.  Tremor-denies.  Neuropathy in extremities-denies.  Psychology: Low energy-denies.  Irritability-denies.  Sleep disturbances-denies.      /79 (BP Location: Right arm, Patient Position: Sitting)   Pulse 110   Ht 1.6 m (5' 3\")   Wt 56.8 kg (125 lb 3.2 oz)   BMI 22.18 kg/m²       Labs:  Lab Results   Component Value Date    WBC 6.0 11/21/2024    NRBC 0.0 11/21/2024    RBC 3.96 (L) 11/21/2024    HGB 11.6 (L) 11/21/2024    HCT 36.1 11/21/2024     11/21/2024     Lab Results   Component Value Date    CALCIUM 9.0 03/27/2025    AST 10 03/27/2025    ALKPHOS 42 03/27/2025    BILITOT 0.5 03/27/2025    PROT 6.1 (L) 03/27/2025    ALBUMIN 4.2 03/27/2025    GLOB 2.4 11/06/2021    AGR 1.9 11/06/2021     03/27/2025    K 4.3 03/27/2025     03/27/2025    CO2 25 03/27/2025    ANIONGAP 13 03/27/2025    BUN 13 03/27/2025    CREATININE 0.64 03/27/2025    UREACREAUR 21.7 (H) 11/06/2021    GLUCOSE 127 (H) 03/27/2025    ALT 8 03/27/2025    EGFR >90 03/27/2025     Lab Results   Component Value Date    CHOL 150 11/21/2024    TRIG 50 11/21/2024    HDL 76.1 11/21/2024    LDLCALC 64 11/21/2024     Lab Results   Component Value Date    MICROALBCREA  10/24/2023      Comment:      One or more analytes used in this calculation is outside of the analytical measurement range. Calculation cannot be performed.     Lab Results   Component Value Date    TSH 2.45 10/24/2023     Lab Results   Component Value Date    [id number] 770 11/21/2024     Lab Results   Component Value Date    HGBA1C 7.0 (A) 05/27/2025         Physical Exam   General Appearance: pleasant, cooperative, no acute distress  HEENT: no chemosis, no proptosis, no lid " lag, no lid retraction  Neck: no lymphadenopathy, no thyromegaly, no dominant thyroid nodules  Heart: no murmurs, regular rate and rhythm, S1 and S2  Lungs: no wheezes, no rhonci, no rales  Extremities: no lower extremity swelling      Assessment/Plan   1. Type 2 diabetes mellitus with hyperglycemia, without long-term current use of insulin (Primary)  -A1c ordered and reviewed  -torito data reviewed, scanned in epic  -labs reviewed, new labs ordered    -torito data looks much better than A1c would suggest today  -continue current meds, call if wt <120lbs    2. Mixed hyperlipidemia  -on statin and tolerating  -labs reviewed, new labs ordered    3. Osteoporosis  -dexa reviewed, getting iv reclast with pcp currently         Follow Up:  Valdemar 6 months    -labs/tests/notes reviewed  -reviewed and counseled patient on medication monitoring and side effects

## 2025-05-27 ENCOUNTER — APPOINTMENT (OUTPATIENT)
Dept: ENDOCRINOLOGY | Facility: CLINIC | Age: 65
End: 2025-05-27
Payer: MEDICARE

## 2025-05-27 VITALS
WEIGHT: 125.2 LBS | HEIGHT: 63 IN | BODY MASS INDEX: 22.18 KG/M2 | DIASTOLIC BLOOD PRESSURE: 79 MMHG | SYSTOLIC BLOOD PRESSURE: 135 MMHG | HEART RATE: 110 BPM

## 2025-05-27 DIAGNOSIS — M81.0 SENILE OSTEOPOROSIS: ICD-10-CM

## 2025-05-27 DIAGNOSIS — E53.8 B12 DEFICIENCY: ICD-10-CM

## 2025-05-27 DIAGNOSIS — E11.65 TYPE 2 DIABETES MELLITUS WITH HYPERGLYCEMIA, WITHOUT LONG-TERM CURRENT USE OF INSULIN: Primary | ICD-10-CM

## 2025-05-27 DIAGNOSIS — E78.2 MIXED HYPERLIPIDEMIA: ICD-10-CM

## 2025-05-27 LAB — POC HEMOGLOBIN A1C: 7 % (ref 4.2–6.5)

## 2025-05-27 PROCEDURE — 3051F HG A1C>EQUAL 7.0%<8.0%: CPT | Performed by: INTERNAL MEDICINE

## 2025-05-27 PROCEDURE — 99214 OFFICE O/P EST MOD 30 MIN: CPT | Performed by: INTERNAL MEDICINE

## 2025-05-27 PROCEDURE — 1159F MED LIST DOCD IN RCRD: CPT | Performed by: INTERNAL MEDICINE

## 2025-05-27 PROCEDURE — 3078F DIAST BP <80 MM HG: CPT | Performed by: INTERNAL MEDICINE

## 2025-05-27 PROCEDURE — 3008F BODY MASS INDEX DOCD: CPT | Performed by: INTERNAL MEDICINE

## 2025-05-27 PROCEDURE — 95251 CONT GLUC MNTR ANALYSIS I&R: CPT | Performed by: INTERNAL MEDICINE

## 2025-05-27 PROCEDURE — 1036F TOBACCO NON-USER: CPT | Performed by: INTERNAL MEDICINE

## 2025-05-27 PROCEDURE — 83036 HEMOGLOBIN GLYCOSYLATED A1C: CPT | Mod: QW | Performed by: INTERNAL MEDICINE

## 2025-05-27 PROCEDURE — 1126F AMNT PAIN NOTED NONE PRSNT: CPT | Performed by: INTERNAL MEDICINE

## 2025-05-27 PROCEDURE — 3075F SYST BP GE 130 - 139MM HG: CPT | Performed by: INTERNAL MEDICINE

## 2025-05-27 RX ORDER — BLOOD-GLUCOSE SENSOR
EACH MISCELLANEOUS
COMMUNITY

## 2025-05-27 ASSESSMENT — ENCOUNTER SYMPTOMS
OCCASIONAL FEELINGS OF UNSTEADINESS: 0
DEPRESSION: 0
LOSS OF SENSATION IN FEET: 0

## 2025-05-27 ASSESSMENT — PAIN SCALES - GENERAL: PAINLEVEL_OUTOF10: 0-NO PAIN

## 2025-06-16 ENCOUNTER — TELEPHONE (OUTPATIENT)
Facility: CLINIC | Age: 65
End: 2025-06-16
Payer: MEDICARE

## 2025-06-16 NOTE — TELEPHONE ENCOUNTER
[facility] will not supply patient with adhesive barrier wipes --she would like to know where to get them now

## 2025-06-24 PROCEDURE — RXMED WILLOW AMBULATORY MEDICATION CHARGE

## 2025-06-26 ENCOUNTER — PHARMACY VISIT (OUTPATIENT)
Dept: PHARMACY | Facility: CLINIC | Age: 65
End: 2025-06-26
Payer: COMMERCIAL

## 2025-07-16 PROCEDURE — RXMED WILLOW AMBULATORY MEDICATION CHARGE

## 2025-07-17 ENCOUNTER — PHARMACY VISIT (OUTPATIENT)
Dept: PHARMACY | Facility: CLINIC | Age: 65
End: 2025-07-17
Payer: COMMERCIAL

## 2025-07-21 PROCEDURE — RXMED WILLOW AMBULATORY MEDICATION CHARGE

## 2025-07-22 ENCOUNTER — PHARMACY VISIT (OUTPATIENT)
Dept: PHARMACY | Facility: CLINIC | Age: 65
End: 2025-07-22
Payer: COMMERCIAL

## 2025-07-28 PROCEDURE — RXMED WILLOW AMBULATORY MEDICATION CHARGE

## 2025-07-29 ENCOUNTER — PHARMACY VISIT (OUTPATIENT)
Dept: PHARMACY | Facility: CLINIC | Age: 65
End: 2025-07-29
Payer: COMMERCIAL

## 2025-07-31 DIAGNOSIS — M81.0 SENILE OSTEOPOROSIS: Primary | ICD-10-CM

## 2025-08-25 DIAGNOSIS — E11.65 TYPE 2 DIABETES MELLITUS WITH HYPERGLYCEMIA, WITHOUT LONG-TERM CURRENT USE OF INSULIN: ICD-10-CM

## 2025-08-25 PROCEDURE — RXMED WILLOW AMBULATORY MEDICATION CHARGE

## 2025-08-25 RX ORDER — PEN NEEDLE, DIABETIC 30 GX3/16"
NEEDLE, DISPOSABLE MISCELLANEOUS
Qty: 100 EACH | Refills: 3 | Status: SHIPPED | OUTPATIENT
Start: 2025-08-25 | End: 2026-08-25

## 2025-08-27 ENCOUNTER — PHARMACY VISIT (OUTPATIENT)
Dept: PHARMACY | Facility: CLINIC | Age: 65
End: 2025-08-27
Payer: COMMERCIAL

## 2025-08-29 ENCOUNTER — TELEPHONE (OUTPATIENT)
Facility: CLINIC | Age: 65
End: 2025-08-29
Payer: MEDICARE

## 2025-09-02 DIAGNOSIS — E11.65 TYPE 2 DIABETES MELLITUS WITH HYPERGLYCEMIA, WITHOUT LONG-TERM CURRENT USE OF INSULIN: ICD-10-CM

## 2025-09-02 RX ORDER — METFORMIN HYDROCHLORIDE 500 MG/1
1000 TABLET, EXTENDED RELEASE ORAL 2 TIMES DAILY
Qty: 360 TABLET | Refills: 3 | Status: SHIPPED | OUTPATIENT
Start: 2025-09-02

## 2025-10-02 ENCOUNTER — APPOINTMENT (OUTPATIENT)
Dept: INFUSION THERAPY | Facility: CLINIC | Age: 65
End: 2025-10-02
Payer: MEDICARE

## 2025-11-25 ENCOUNTER — APPOINTMENT (OUTPATIENT)
Facility: CLINIC | Age: 65
End: 2025-11-25
Payer: MEDICARE